# Patient Record
Sex: FEMALE | Race: OTHER | HISPANIC OR LATINO | URBAN - METROPOLITAN AREA
[De-identification: names, ages, dates, MRNs, and addresses within clinical notes are randomized per-mention and may not be internally consistent; named-entity substitution may affect disease eponyms.]

---

## 2017-04-28 PROBLEM — Z00.00 ENCOUNTER FOR PREVENTIVE HEALTH EXAMINATION: Status: ACTIVE | Noted: 2017-04-28

## 2017-05-01 ENCOUNTER — OUTPATIENT (OUTPATIENT)
Dept: OUTPATIENT SERVICES | Age: 27
LOS: 1 days | Discharge: HOME | End: 2017-05-01

## 2017-05-11 ENCOUNTER — APPOINTMENT (OUTPATIENT)
Dept: PEDIATRIC CARDIOLOGY | Facility: CLINIC | Age: 27
End: 2017-05-11

## 2017-08-13 ENCOUNTER — TRANSCRIPTION ENCOUNTER (OUTPATIENT)
Age: 27
End: 2017-08-13

## 2017-08-13 ENCOUNTER — INPATIENT (INPATIENT)
Facility: HOSPITAL | Age: 27
LOS: 4 days | Discharge: HOME | End: 2017-08-18
Attending: OBSTETRICS & GYNECOLOGY | Admitting: OBSTETRICS & GYNECOLOGY

## 2017-08-13 VITALS — HEIGHT: 62 IN | WEIGHT: 207.23 LBS

## 2017-08-13 DIAGNOSIS — Z3A.00 WEEKS OF GESTATION OF PREGNANCY NOT SPECIFIED: ICD-10-CM

## 2017-08-13 DIAGNOSIS — O26.899 OTHER SPECIFIED PREGNANCY RELATED CONDITIONS, UNSPECIFIED TRIMESTER: ICD-10-CM

## 2017-08-13 LAB
ALBUMIN SERPL ELPH-MCNC: 3.4 G/DL — SIGNIFICANT CHANGE UP (ref 3.3–5)
ALP SERPL-CCNC: 232 U/L — HIGH (ref 40–120)
ALT FLD-CCNC: 9 U/L — SIGNIFICANT CHANGE UP (ref 4–33)
APPEARANCE UR: CLEAR — SIGNIFICANT CHANGE UP
APTT BLD: 28 SEC — SIGNIFICANT CHANGE UP (ref 27.5–37.4)
AST SERPL-CCNC: 17 U/L — SIGNIFICANT CHANGE UP (ref 4–32)
BACTERIA # UR AUTO: SIGNIFICANT CHANGE UP
BASOPHILS # BLD AUTO: 0.01 K/UL — SIGNIFICANT CHANGE UP (ref 0–0.2)
BASOPHILS NFR BLD AUTO: 0.1 % — SIGNIFICANT CHANGE UP (ref 0–2)
BILIRUB SERPL-MCNC: 0.2 MG/DL — SIGNIFICANT CHANGE UP (ref 0.2–1.2)
BILIRUB UR-MCNC: NEGATIVE — SIGNIFICANT CHANGE UP
BLD GP AB SCN SERPL QL: NEGATIVE — SIGNIFICANT CHANGE UP
BLOOD UR QL VISUAL: HIGH
BUN SERPL-MCNC: 8 MG/DL — SIGNIFICANT CHANGE UP (ref 7–23)
CALCIUM SERPL-MCNC: 9 MG/DL — SIGNIFICANT CHANGE UP (ref 8.4–10.5)
CHLORIDE SERPL-SCNC: 104 MMOL/L — SIGNIFICANT CHANGE UP (ref 98–107)
CO2 SERPL-SCNC: 19 MMOL/L — LOW (ref 22–31)
COLOR SPEC: YELLOW — SIGNIFICANT CHANGE UP
CREAT ?TM UR-MCNC: 107.33 MG/DL — SIGNIFICANT CHANGE UP
CREAT SERPL-MCNC: 0.59 MG/DL — SIGNIFICANT CHANGE UP (ref 0.5–1.3)
EOSINOPHIL # BLD AUTO: 0.04 K/UL — SIGNIFICANT CHANGE UP (ref 0–0.5)
EOSINOPHIL NFR BLD AUTO: 0.4 % — SIGNIFICANT CHANGE UP (ref 0–6)
FIBRINOGEN PPP-MCNC: 658 MG/DL — HIGH (ref 310–510)
GLUCOSE SERPL-MCNC: 88 MG/DL — SIGNIFICANT CHANGE UP (ref 70–99)
GLUCOSE UR-MCNC: NEGATIVE — SIGNIFICANT CHANGE UP
HBV SURFACE AG SER-ACNC: NEGATIVE — SIGNIFICANT CHANGE UP
HCT VFR BLD CALC: 38.8 % — SIGNIFICANT CHANGE UP (ref 34.5–45)
HGB BLD-MCNC: 12.5 G/DL — SIGNIFICANT CHANGE UP (ref 11.5–15.5)
HIV1 AG SER QL: SIGNIFICANT CHANGE UP
HIV1+2 AB SPEC QL: SIGNIFICANT CHANGE UP
IMM GRANULOCYTES # BLD AUTO: 0.02 # — SIGNIFICANT CHANGE UP
IMM GRANULOCYTES NFR BLD AUTO: 0.2 % — SIGNIFICANT CHANGE UP (ref 0–1.5)
INR BLD: 0.89 — SIGNIFICANT CHANGE UP (ref 0.88–1.17)
KETONES UR-MCNC: SIGNIFICANT CHANGE UP
LDH SERPL L TO P-CCNC: 249 U/L — HIGH (ref 135–225)
LEUKOCYTE ESTERASE UR-ACNC: NEGATIVE — SIGNIFICANT CHANGE UP
LYMPHOCYTES # BLD AUTO: 3.14 K/UL — SIGNIFICANT CHANGE UP (ref 1–3.3)
LYMPHOCYTES # BLD AUTO: 32.7 % — SIGNIFICANT CHANGE UP (ref 13–44)
MCHC RBC-ENTMCNC: 25.3 PG — LOW (ref 27–34)
MCHC RBC-ENTMCNC: 32.2 % — SIGNIFICANT CHANGE UP (ref 32–36)
MCV RBC AUTO: 78.4 FL — LOW (ref 80–100)
MONOCYTES # BLD AUTO: 0.74 K/UL — SIGNIFICANT CHANGE UP (ref 0–0.9)
MONOCYTES NFR BLD AUTO: 7.7 % — SIGNIFICANT CHANGE UP (ref 2–14)
MUCOUS THREADS # UR AUTO: SIGNIFICANT CHANGE UP
NEUTROPHILS # BLD AUTO: 5.64 K/UL — SIGNIFICANT CHANGE UP (ref 1.8–7.4)
NEUTROPHILS NFR BLD AUTO: 58.9 % — SIGNIFICANT CHANGE UP (ref 43–77)
NITRITE UR-MCNC: NEGATIVE — SIGNIFICANT CHANGE UP
NRBC # FLD: 0 — SIGNIFICANT CHANGE UP
PH UR: 6.5 — SIGNIFICANT CHANGE UP (ref 4.6–8)
PLATELET # BLD AUTO: 214 K/UL — SIGNIFICANT CHANGE UP (ref 150–400)
PMV BLD: 11.1 FL — SIGNIFICANT CHANGE UP (ref 7–13)
POTASSIUM SERPL-MCNC: 4.1 MMOL/L — SIGNIFICANT CHANGE UP (ref 3.5–5.3)
POTASSIUM SERPL-SCNC: 4.1 MMOL/L — SIGNIFICANT CHANGE UP (ref 3.5–5.3)
PROT SERPL-MCNC: 6.6 G/DL — SIGNIFICANT CHANGE UP (ref 6–8.3)
PROT UR-MCNC: 100 — HIGH
PROT UR-MCNC: 78.3 MG/DL — SIGNIFICANT CHANGE UP
PROTHROM AB SERPL-ACNC: 9.9 SEC — SIGNIFICANT CHANGE UP (ref 9.8–13.1)
RBC # BLD: 4.95 M/UL — SIGNIFICANT CHANGE UP (ref 3.8–5.2)
RBC # FLD: 16.2 % — HIGH (ref 10.3–14.5)
RBC CASTS # UR COMP ASSIST: SIGNIFICANT CHANGE UP (ref 0–?)
RH IG SCN BLD-IMP: POSITIVE — SIGNIFICANT CHANGE UP
SODIUM SERPL-SCNC: 137 MMOL/L — SIGNIFICANT CHANGE UP (ref 135–145)
SP GR SPEC: 1.02 — SIGNIFICANT CHANGE UP (ref 1–1.03)
SQUAMOUS # UR AUTO: SIGNIFICANT CHANGE UP
URATE SERPL-MCNC: 4.1 MG/DL — SIGNIFICANT CHANGE UP (ref 2.5–7)
UROBILINOGEN FLD QL: NORMAL E.U. — SIGNIFICANT CHANGE UP (ref 0.1–0.2)
WBC # BLD: 9.59 K/UL — SIGNIFICANT CHANGE UP (ref 3.8–10.5)
WBC # FLD AUTO: 9.59 K/UL — SIGNIFICANT CHANGE UP (ref 3.8–10.5)
WBC UR QL: SIGNIFICANT CHANGE UP (ref 0–?)

## 2017-08-13 RX ORDER — OXYTOCIN 10 UNIT/ML
333.33 VIAL (ML) INJECTION
Qty: 20 | Refills: 0 | Status: DISCONTINUED | OUTPATIENT
Start: 2017-08-13 | End: 2017-08-13

## 2017-08-13 RX ORDER — AMPICILLIN TRIHYDRATE 250 MG
1 CAPSULE ORAL EVERY 4 HOURS
Qty: 0 | Refills: 0 | Status: DISCONTINUED | OUTPATIENT
Start: 2017-08-13 | End: 2017-08-14

## 2017-08-13 RX ORDER — SODIUM CHLORIDE 9 MG/ML
500 INJECTION INTRAMUSCULAR; INTRAVENOUS; SUBCUTANEOUS ONCE
Qty: 0 | Refills: 0 | Status: COMPLETED | OUTPATIENT
Start: 2017-08-13 | End: 2017-08-13

## 2017-08-13 RX ORDER — SODIUM CHLORIDE 9 MG/ML
125 INJECTION, SOLUTION INTRAVENOUS ONCE
Qty: 0 | Refills: 0 | Status: DISCONTINUED | OUTPATIENT
Start: 2017-08-13 | End: 2017-08-13

## 2017-08-13 RX ORDER — SODIUM CHLORIDE 9 MG/ML
1000 INJECTION, SOLUTION INTRAVENOUS
Qty: 0 | Refills: 0 | Status: DISCONTINUED | OUTPATIENT
Start: 2017-08-13 | End: 2017-08-13

## 2017-08-13 RX ORDER — LEVOTHYROXINE SODIUM 125 MCG
50 TABLET ORAL DAILY
Qty: 0 | Refills: 0 | Status: DISCONTINUED | OUTPATIENT
Start: 2017-08-13 | End: 2017-08-14

## 2017-08-13 RX ORDER — METOCLOPRAMIDE HCL 10 MG
25 TABLET ORAL ONCE
Qty: 0 | Refills: 0 | Status: DISCONTINUED | OUTPATIENT
Start: 2017-08-13 | End: 2017-08-13

## 2017-08-13 RX ORDER — BUTORPHANOL TARTRATE 2 MG/ML
2 INJECTION, SOLUTION INTRAMUSCULAR; INTRAVENOUS ONCE
Qty: 0 | Refills: 0 | Status: DISCONTINUED | OUTPATIENT
Start: 2017-08-13 | End: 2017-08-13

## 2017-08-13 RX ORDER — AMPICILLIN TRIHYDRATE 250 MG
2 CAPSULE ORAL ONCE
Qty: 0 | Refills: 0 | Status: COMPLETED | OUTPATIENT
Start: 2017-08-13 | End: 2017-08-13

## 2017-08-13 RX ORDER — AMPICILLIN TRIHYDRATE 250 MG
CAPSULE ORAL
Qty: 0 | Refills: 0 | Status: DISCONTINUED | OUTPATIENT
Start: 2017-08-13 | End: 2017-08-14

## 2017-08-13 RX ORDER — SODIUM CHLORIDE 9 MG/ML
300 INJECTION INTRAMUSCULAR; INTRAVENOUS; SUBCUTANEOUS ONCE
Qty: 0 | Refills: 0 | Status: DISCONTINUED | OUTPATIENT
Start: 2017-08-13 | End: 2017-08-14

## 2017-08-13 RX ADMIN — Medication 108 GRAM(S): at 22:50

## 2017-08-13 RX ADMIN — Medication 108 GRAM(S): at 05:36

## 2017-08-13 RX ADMIN — BUTORPHANOL TARTRATE 2 MILLIGRAM(S): 2 INJECTION, SOLUTION INTRAMUSCULAR; INTRAVENOUS at 09:35

## 2017-08-13 RX ADMIN — SODIUM CHLORIDE 1000 MILLILITER(S): 9 INJECTION INTRAMUSCULAR; INTRAVENOUS; SUBCUTANEOUS at 19:52

## 2017-08-13 RX ADMIN — Medication 10 MILLIGRAM(S): at 23:59

## 2017-08-13 RX ADMIN — Medication 204 MILLIGRAM(S): at 09:35

## 2017-08-13 RX ADMIN — Medication 50 MICROGRAM(S): at 06:06

## 2017-08-13 RX ADMIN — Medication 108 GRAM(S): at 18:10

## 2017-08-13 RX ADMIN — FAMOTIDINE 20 MILLIGRAM(S): 10 INJECTION INTRAVENOUS at 23:59

## 2017-08-13 RX ADMIN — SODIUM CHLORIDE 125 MILLILITER(S): 9 INJECTION, SOLUTION INTRAVENOUS at 05:36

## 2017-08-13 RX ADMIN — SODIUM CHLORIDE 125 MILLILITER(S): 9 INJECTION, SOLUTION INTRAVENOUS at 09:36

## 2017-08-13 RX ADMIN — Medication 108 GRAM(S): at 10:00

## 2017-08-13 RX ADMIN — BUTORPHANOL TARTRATE 2 MILLIGRAM(S): 2 INJECTION, SOLUTION INTRAMUSCULAR; INTRAVENOUS at 10:10

## 2017-08-13 RX ADMIN — Medication 108 GRAM(S): at 14:10

## 2017-08-14 ENCOUNTER — TRANSCRIPTION ENCOUNTER (OUTPATIENT)
Age: 27
End: 2017-08-14

## 2017-08-14 LAB
HCT VFR BLD CALC: 31.8 % — LOW (ref 34.5–45)
HGB BLD-MCNC: 10.1 G/DL — LOW (ref 11.5–15.5)
MCHC RBC-ENTMCNC: 24.7 PG — LOW (ref 27–34)
MCHC RBC-ENTMCNC: 31.8 % — LOW (ref 32–36)
MCV RBC AUTO: 77.8 FL — LOW (ref 80–100)
NRBC # FLD: 0 — SIGNIFICANT CHANGE UP
PLATELET # BLD AUTO: 182 K/UL — SIGNIFICANT CHANGE UP (ref 150–400)
PMV BLD: 10.7 FL — SIGNIFICANT CHANGE UP (ref 7–13)
RBC # BLD: 4.09 M/UL — SIGNIFICANT CHANGE UP (ref 3.8–5.2)
RBC # FLD: 16.5 % — HIGH (ref 10.3–14.5)
RUBV IGG SER-ACNC: 1.7 INDEX — SIGNIFICANT CHANGE UP
RUBV IGG SER-IMP: POSITIVE — SIGNIFICANT CHANGE UP
T PALLIDUM AB TITR SER: NEGATIVE — SIGNIFICANT CHANGE UP
WBC # BLD: 11.58 K/UL — HIGH (ref 3.8–10.5)
WBC # FLD AUTO: 11.58 K/UL — HIGH (ref 3.8–10.5)

## 2017-08-14 RX ORDER — LEVOTHYROXINE SODIUM 125 MCG
50 TABLET ORAL DAILY
Qty: 0 | Refills: 0 | Status: DISCONTINUED | OUTPATIENT
Start: 2017-08-14 | End: 2017-08-18

## 2017-08-14 RX ORDER — DIPHENHYDRAMINE HCL 50 MG
25 CAPSULE ORAL EVERY 6 HOURS
Qty: 0 | Refills: 0 | Status: DISCONTINUED | OUTPATIENT
Start: 2017-08-14 | End: 2017-08-18

## 2017-08-14 RX ORDER — GENTAMICIN SULFATE 40 MG/ML
80 VIAL (ML) INJECTION ONCE
Qty: 0 | Refills: 0 | Status: COMPLETED | OUTPATIENT
Start: 2017-08-14 | End: 2017-08-14

## 2017-08-14 RX ORDER — SODIUM CHLORIDE 9 MG/ML
1000 INJECTION, SOLUTION INTRAVENOUS ONCE
Qty: 0 | Refills: 0 | Status: DISCONTINUED | OUTPATIENT
Start: 2017-08-14 | End: 2017-08-14

## 2017-08-14 RX ORDER — DOCUSATE SODIUM 100 MG
100 CAPSULE ORAL
Qty: 0 | Refills: 0 | Status: DISCONTINUED | OUTPATIENT
Start: 2017-08-14 | End: 2017-08-18

## 2017-08-14 RX ORDER — BUTORPHANOL TARTRATE 2 MG/ML
0.25 INJECTION, SOLUTION INTRAMUSCULAR; INTRAVENOUS EVERY 6 HOURS
Qty: 0 | Refills: 0 | Status: DISCONTINUED | OUTPATIENT
Start: 2017-08-14 | End: 2017-08-15

## 2017-08-14 RX ORDER — OXYCODONE HYDROCHLORIDE 5 MG/1
5 TABLET ORAL
Qty: 0 | Refills: 0 | Status: DISCONTINUED | OUTPATIENT
Start: 2017-08-14 | End: 2017-08-14

## 2017-08-14 RX ORDER — FAMOTIDINE 10 MG/ML
20 INJECTION INTRAVENOUS ONCE
Qty: 0 | Refills: 0 | Status: COMPLETED | OUTPATIENT
Start: 2017-08-14 | End: 2017-08-13

## 2017-08-14 RX ORDER — OXYTOCIN 10 UNIT/ML
333.33 VIAL (ML) INJECTION
Qty: 20 | Refills: 0 | Status: DISCONTINUED | OUTPATIENT
Start: 2017-08-14 | End: 2017-08-14

## 2017-08-14 RX ORDER — METOCLOPRAMIDE HCL 10 MG
10 TABLET ORAL ONCE
Qty: 0 | Refills: 0 | Status: DISCONTINUED | OUTPATIENT
Start: 2017-08-14 | End: 2017-08-15

## 2017-08-14 RX ORDER — LANOLIN
1 OINTMENT (GRAM) TOPICAL
Qty: 0 | Refills: 0 | Status: DISCONTINUED | OUTPATIENT
Start: 2017-08-14 | End: 2017-08-18

## 2017-08-14 RX ORDER — OXYCODONE HYDROCHLORIDE 5 MG/1
10 TABLET ORAL
Qty: 0 | Refills: 0 | Status: DISCONTINUED | OUTPATIENT
Start: 2017-08-14 | End: 2017-08-14

## 2017-08-14 RX ORDER — ONDANSETRON 8 MG/1
4 TABLET, FILM COATED ORAL EVERY 6 HOURS
Qty: 0 | Refills: 0 | Status: DISCONTINUED | OUTPATIENT
Start: 2017-08-14 | End: 2017-08-15

## 2017-08-14 RX ORDER — HEPARIN SODIUM 5000 [USP'U]/ML
5000 INJECTION INTRAVENOUS; SUBCUTANEOUS EVERY 12 HOURS
Qty: 0 | Refills: 0 | Status: DISCONTINUED | OUTPATIENT
Start: 2017-08-14 | End: 2017-08-18

## 2017-08-14 RX ORDER — GENTAMICIN SULFATE 40 MG/ML
80 VIAL (ML) INJECTION ONCE
Qty: 0 | Refills: 0 | Status: DISCONTINUED | OUTPATIENT
Start: 2017-08-14 | End: 2017-08-14

## 2017-08-14 RX ORDER — CITRIC ACID/SODIUM CITRATE 300-500 MG
30 SOLUTION, ORAL ORAL ONCE
Qty: 0 | Refills: 0 | Status: DISCONTINUED | OUTPATIENT
Start: 2017-08-14 | End: 2017-08-15

## 2017-08-14 RX ORDER — FAMOTIDINE 10 MG/ML
20 INJECTION INTRAVENOUS ONCE
Qty: 0 | Refills: 0 | Status: DISCONTINUED | OUTPATIENT
Start: 2017-08-14 | End: 2017-08-15

## 2017-08-14 RX ORDER — GENTAMICIN SULFATE 40 MG/ML
VIAL (ML) INJECTION
Qty: 0 | Refills: 0 | Status: DISCONTINUED | OUTPATIENT
Start: 2017-08-14 | End: 2017-08-15

## 2017-08-14 RX ORDER — CITRIC ACID/SODIUM CITRATE 300-500 MG
30 SOLUTION, ORAL ORAL ONCE
Qty: 0 | Refills: 0 | Status: COMPLETED | OUTPATIENT
Start: 2017-08-14 | End: 2017-08-14

## 2017-08-14 RX ORDER — HYDROMORPHONE HYDROCHLORIDE 2 MG/ML
1 INJECTION INTRAMUSCULAR; INTRAVENOUS; SUBCUTANEOUS
Qty: 0 | Refills: 0 | Status: DISCONTINUED | OUTPATIENT
Start: 2017-08-14 | End: 2017-08-14

## 2017-08-14 RX ORDER — CEFAZOLIN SODIUM 1 G
2000 VIAL (EA) INJECTION ONCE
Qty: 0 | Refills: 0 | Status: DISCONTINUED | OUTPATIENT
Start: 2017-08-14 | End: 2017-08-14

## 2017-08-14 RX ORDER — IBUPROFEN 200 MG
600 TABLET ORAL EVERY 6 HOURS
Qty: 0 | Refills: 0 | Status: COMPLETED | OUTPATIENT
Start: 2017-08-14 | End: 2018-07-13

## 2017-08-14 RX ORDER — GENTAMICIN SULFATE 40 MG/ML
VIAL (ML) INJECTION
Qty: 0 | Refills: 0 | Status: DISCONTINUED | OUTPATIENT
Start: 2017-08-14 | End: 2017-08-14

## 2017-08-14 RX ORDER — NALOXONE HYDROCHLORIDE 4 MG/.1ML
0.1 SPRAY NASAL
Qty: 0 | Refills: 0 | Status: DISCONTINUED | OUTPATIENT
Start: 2017-08-14 | End: 2017-08-15

## 2017-08-14 RX ORDER — OXYTOCIN 10 UNIT/ML
41.67 VIAL (ML) INJECTION
Qty: 20 | Refills: 0 | Status: DISCONTINUED | OUTPATIENT
Start: 2017-08-14 | End: 2017-08-14

## 2017-08-14 RX ORDER — METOCLOPRAMIDE HCL 10 MG
10 TABLET ORAL ONCE
Qty: 0 | Refills: 0 | Status: COMPLETED | OUTPATIENT
Start: 2017-08-14 | End: 2017-08-13

## 2017-08-14 RX ORDER — FERROUS SULFATE 325(65) MG
325 TABLET ORAL DAILY
Qty: 0 | Refills: 0 | Status: DISCONTINUED | OUTPATIENT
Start: 2017-08-14 | End: 2017-08-18

## 2017-08-14 RX ORDER — OXYCODONE HYDROCHLORIDE 5 MG/1
5 TABLET ORAL
Qty: 0 | Refills: 0 | Status: COMPLETED | OUTPATIENT
Start: 2017-08-14 | End: 2017-08-21

## 2017-08-14 RX ORDER — OXYCODONE HYDROCHLORIDE 5 MG/1
5 TABLET ORAL EVERY 4 HOURS
Qty: 0 | Refills: 0 | Status: COMPLETED | OUTPATIENT
Start: 2017-08-14 | End: 2017-08-21

## 2017-08-14 RX ORDER — SIMETHICONE 80 MG/1
80 TABLET, CHEWABLE ORAL EVERY 4 HOURS
Qty: 0 | Refills: 0 | Status: DISCONTINUED | OUTPATIENT
Start: 2017-08-14 | End: 2017-08-18

## 2017-08-14 RX ORDER — SODIUM CHLORIDE 9 MG/ML
1000 INJECTION, SOLUTION INTRAVENOUS
Qty: 0 | Refills: 0 | Status: DISCONTINUED | OUTPATIENT
Start: 2017-08-14 | End: 2017-08-14

## 2017-08-14 RX ORDER — GLYCERIN ADULT
1 SUPPOSITORY, RECTAL RECTAL AT BEDTIME
Qty: 0 | Refills: 0 | Status: DISCONTINUED | OUTPATIENT
Start: 2017-08-14 | End: 2017-08-18

## 2017-08-14 RX ORDER — SODIUM CHLORIDE 9 MG/ML
1000 INJECTION, SOLUTION INTRAVENOUS
Qty: 0 | Refills: 0 | Status: DISCONTINUED | OUTPATIENT
Start: 2017-08-14 | End: 2017-08-15

## 2017-08-14 RX ORDER — ACETAMINOPHEN 500 MG
975 TABLET ORAL ONCE
Qty: 0 | Refills: 0 | Status: COMPLETED | OUTPATIENT
Start: 2017-08-14 | End: 2017-08-14

## 2017-08-14 RX ORDER — ACETAMINOPHEN 500 MG
975 TABLET ORAL EVERY 6 HOURS
Qty: 0 | Refills: 0 | Status: DISCONTINUED | OUTPATIENT
Start: 2017-08-14 | End: 2017-08-18

## 2017-08-14 RX ORDER — TETANUS TOXOID, REDUCED DIPHTHERIA TOXOID AND ACELLULAR PERTUSSIS VACCINE, ADSORBED 5; 2.5; 8; 8; 2.5 [IU]/.5ML; [IU]/.5ML; UG/.5ML; UG/.5ML; UG/.5ML
0.5 SUSPENSION INTRAMUSCULAR ONCE
Qty: 0 | Refills: 0 | Status: DISCONTINUED | OUTPATIENT
Start: 2017-08-14 | End: 2017-08-18

## 2017-08-14 RX ORDER — KETOROLAC TROMETHAMINE 30 MG/ML
30 SYRINGE (ML) INJECTION EVERY 6 HOURS
Qty: 0 | Refills: 0 | Status: DISCONTINUED | OUTPATIENT
Start: 2017-08-14 | End: 2017-08-15

## 2017-08-14 RX ORDER — GENTAMICIN SULFATE 40 MG/ML
80 VIAL (ML) INJECTION EVERY 8 HOURS
Qty: 0 | Refills: 0 | Status: DISCONTINUED | OUTPATIENT
Start: 2017-08-14 | End: 2017-08-14

## 2017-08-14 RX ORDER — GENTAMICIN SULFATE 40 MG/ML
80 VIAL (ML) INJECTION EVERY 8 HOURS
Qty: 0 | Refills: 0 | Status: DISCONTINUED | OUTPATIENT
Start: 2017-08-14 | End: 2017-08-15

## 2017-08-14 RX ADMIN — Medication 30 MILLIGRAM(S): at 04:00

## 2017-08-14 RX ADMIN — Medication 50 MICROGRAM(S): at 13:44

## 2017-08-14 RX ADMIN — Medication 30 MILLIGRAM(S): at 09:57

## 2017-08-14 RX ADMIN — SODIUM CHLORIDE 125 MILLILITER(S): 9 INJECTION, SOLUTION INTRAVENOUS at 10:18

## 2017-08-14 RX ADMIN — Medication 200 MILLIGRAM(S): at 22:21

## 2017-08-14 RX ADMIN — Medication 30 MILLIGRAM(S): at 09:42

## 2017-08-14 RX ADMIN — Medication 30 MILLILITER(S): at 00:21

## 2017-08-14 RX ADMIN — Medication 125 MILLIUNIT(S)/MIN: at 03:40

## 2017-08-14 RX ADMIN — Medication 200 MILLIGRAM(S): at 13:47

## 2017-08-14 RX ADMIN — Medication 100 MILLIGRAM(S): at 08:28

## 2017-08-14 RX ADMIN — Medication 100 MILLIGRAM(S): at 16:36

## 2017-08-14 RX ADMIN — Medication 975 MILLIGRAM(S): at 04:18

## 2017-08-14 RX ADMIN — HEPARIN SODIUM 5000 UNIT(S): 5000 INJECTION INTRAVENOUS; SUBCUTANEOUS at 13:44

## 2017-08-14 RX ADMIN — Medication 200 MILLIGRAM(S): at 05:14

## 2017-08-14 RX ADMIN — Medication 100 MILLIGRAM(S): at 04:30

## 2017-08-14 RX ADMIN — Medication 30 MILLIGRAM(S): at 03:37

## 2017-08-14 RX ADMIN — Medication 125 MILLIUNIT(S)/MIN: at 05:24

## 2017-08-14 RX ADMIN — Medication 30 MILLIGRAM(S): at 18:30

## 2017-08-14 RX ADMIN — Medication 975 MILLIGRAM(S): at 04:45

## 2017-08-14 RX ADMIN — Medication 975 MILLIGRAM(S): at 22:21

## 2017-08-14 NOTE — DISCHARGE NOTE OB - CARE PLAN
Principal Discharge DX:	Postpartum care following  delivery  Goal:	full recovery  Instructions for follow-up, activity and diet:	nothing per vagina, no heavy lifting for 6 weeks

## 2017-08-14 NOTE — DISCHARGE NOTE OB - CARE PROVIDER_API CALL
Kathia Daley), Obstetrics and Gynecology  [address]  [address]  [place] [address]  Phone: [phone]  Fax: [phone]

## 2017-08-14 NOTE — PROGRESS NOTE ADULT - ASSESSMENT
A/P: 27yo  now POD0 s/p pLTCS for NRFHT c/b PP fevers at 0400 and 1800 today.  Exam reassuring with no focal signs of infection.  Pt on Gent/Clinda since 0400.  Pt was refusing po tylenol throughout the day.  IV line is infiltrated and pt desires breast pump while baby is in NICU.  Pt is scheduled for 6p CBC.

## 2017-08-14 NOTE — PROGRESS NOTE ADULT - PROBLEM SELECTOR PLAN 1
-continue with 6p CBC  -blood and urine cultures x2  -continue Gent/Clinda until 24h afebrile  -q4h VS  -po tylenol for fever  -remove and replace infiltrated IV line  -continue maintenance IVF and encourage po clear liquids  -SCDs in place  -cold packs for comfort  -breast pump    Discussed with Dr. Pink.    Savanah Swann MD PGY1  Patient seen and examined with aid from Conner Matamoros, MS3

## 2017-08-14 NOTE — PROGRESS NOTE ADULT - SUBJECTIVE AND OBJECTIVE BOX
R1 Postpartum Event Note    Pt is [place]er-only speaking.  Interview was conducted with aid of Cnoner Matamoros, MS3 who is fluent in [place]; as well as patient's  who speaks both english and Welsh.    S-Called to room by nurse for patient rectal temperature of 38.5C.  Patient is currently on Gent/Clinda since 0400 today when she had a temperature of 38.7. Since then she has been accepting the IV abx however she has refused tylenol throughout the day.  She has not started breast feeding.  She feels breast fullness but denies pain.  She is passing flatus and taking some po.  She has been out of bed once with mild dizzyness on standing but had no other difficulties ambulating.  Helms catheter is still in place.  She denies HA, SOB, CP, N, V, D, Abd pain, incisional pain, suprapubic or flank pain, and lower extremity pain/swelling.  She has no purulent vaginal discharge.  Lochia normal, <menses.  Pt complains of nonpainful localized swelling in right hand since infusion of iv toradol through that arm's iv line this afternoon.    O-VS: 38.7C (rectal), , /72, RR 18, SpO2 100%  Gen: NAD, A&Ox3, sitting up in bed  Skin: warm and dry to touch  HEENT: no scleral icterus, mucosae moist and pink, no tonsillar/palatal erythema, neck supple  CV: tachycardia, regular rhythm, no m/g/r  Resp: posterior, apical and lateral lung fields clear to auscultation bilaterally, no dullness to percussino  Abd: soft, NBS, non distended, no tympany to percussion, appropriately ttp, no rebound/peritoneal signs, incision c/d/i w/o erythema/edema, uterus firm and below umbilicus  : no flank or suprapubic ttp, no purulent vaginal discharge noted on pad, lochia wnl  Ext: no LE ttp, erythema or focal edema; b/l 1+ pitting edema; SCDs in place

## 2017-08-14 NOTE — PROVIDER CONTACT NOTE (OTHER) - ASSESSMENT
Fundus firm without massage, light bleeding, lungs are clear bilaterally, reflexes 2+, no c/o of dizziness or SOB, bp136/78, HR89, pulse ox98, temp 38.7 rectal, 37.9 oral. Pt. states to feel warm. Pt. examined and evaluated by MD Dia in PACU 6.

## 2017-08-15 LAB — SPECIMEN SOURCE: SIGNIFICANT CHANGE UP

## 2017-08-15 RX ORDER — OXYCODONE HYDROCHLORIDE 5 MG/1
5 TABLET ORAL
Qty: 0 | Refills: 0 | Status: DISCONTINUED | OUTPATIENT
Start: 2017-08-15 | End: 2017-08-18

## 2017-08-15 RX ORDER — IBUPROFEN 200 MG
600 TABLET ORAL EVERY 6 HOURS
Qty: 0 | Refills: 0 | Status: DISCONTINUED | OUTPATIENT
Start: 2017-08-15 | End: 2017-08-18

## 2017-08-15 RX ADMIN — Medication 600 MILLIGRAM(S): at 06:45

## 2017-08-15 RX ADMIN — Medication 600 MILLIGRAM(S): at 18:57

## 2017-08-15 RX ADMIN — Medication 975 MILLIGRAM(S): at 18:58

## 2017-08-15 RX ADMIN — Medication 600 MILLIGRAM(S): at 19:26

## 2017-08-15 RX ADMIN — Medication 600 MILLIGRAM(S): at 05:48

## 2017-08-15 RX ADMIN — Medication 100 MILLIGRAM(S): at 00:29

## 2017-08-15 RX ADMIN — SIMETHICONE 80 MILLIGRAM(S): 80 TABLET, CHEWABLE ORAL at 18:58

## 2017-08-15 RX ADMIN — Medication 975 MILLIGRAM(S): at 05:48

## 2017-08-15 RX ADMIN — Medication 100 MILLIGRAM(S): at 18:57

## 2017-08-15 RX ADMIN — Medication 200 MILLIGRAM(S): at 14:29

## 2017-08-15 RX ADMIN — HEPARIN SODIUM 5000 UNIT(S): 5000 INJECTION INTRAVENOUS; SUBCUTANEOUS at 00:31

## 2017-08-15 RX ADMIN — Medication 30 MILLIGRAM(S): at 00:30

## 2017-08-15 RX ADMIN — Medication 30 MILLIGRAM(S): at 01:30

## 2017-08-15 RX ADMIN — Medication 50 MICROGRAM(S): at 05:49

## 2017-08-15 RX ADMIN — Medication 600 MILLIGRAM(S): at 12:20

## 2017-08-15 RX ADMIN — Medication 975 MILLIGRAM(S): at 19:26

## 2017-08-15 RX ADMIN — Medication 975 MILLIGRAM(S): at 12:50

## 2017-08-15 RX ADMIN — Medication 975 MILLIGRAM(S): at 12:20

## 2017-08-15 RX ADMIN — SIMETHICONE 80 MILLIGRAM(S): 80 TABLET, CHEWABLE ORAL at 10:45

## 2017-08-15 RX ADMIN — Medication 975 MILLIGRAM(S): at 06:45

## 2017-08-15 RX ADMIN — Medication 200 MILLIGRAM(S): at 05:48

## 2017-08-15 RX ADMIN — SIMETHICONE 80 MILLIGRAM(S): 80 TABLET, CHEWABLE ORAL at 14:28

## 2017-08-15 RX ADMIN — OXYCODONE HYDROCHLORIDE 5 MILLIGRAM(S): 5 TABLET ORAL at 23:51

## 2017-08-15 RX ADMIN — HEPARIN SODIUM 5000 UNIT(S): 5000 INJECTION INTRAVENOUS; SUBCUTANEOUS at 14:28

## 2017-08-15 RX ADMIN — Medication 100 MILLIGRAM(S): at 16:04

## 2017-08-15 RX ADMIN — Medication 100 MILLIGRAM(S): at 08:41

## 2017-08-15 RX ADMIN — Medication 100 MILLIGRAM(S): at 10:45

## 2017-08-15 RX ADMIN — Medication 600 MILLIGRAM(S): at 12:50

## 2017-08-15 NOTE — PROGRESS NOTE ADULT - SUBJECTIVE AND OBJECTIVE BOX
ANESTHESIA POSTOP CHECK    26y Female POSTOP DAY 1     No COMPLAINTS    NO APPARENT ANESTHESIA COMPLICATIONS

## 2017-08-15 NOTE — PROGRESS NOTE ADULT - SUBJECTIVE AND OBJECTIVE BOX
POST OP DAY  1  s/p   SECTION    SUBJECTIVE:    PAIN SCALE SCORE: [x] Refer to charted pain scores    THERAPY:  [  ] Spinal morphine   [ x ] Epidural morphine   [  ] IV PCA Hydromorphone 1 mg/ml    OBJECTIVE:     SEDATION SCORE:	  [ x ] Alert	    [  ] Drowsy        [  ] Arousable	[  ] Asleep	[  ] Unresponsive    Side Effects:	  [ x ] None	     [  ] Nausea        [  ] Pruritus        [  ] Weakness   [  ] Numbness        ASSESSMENT/ PLAN   [   ] Discontinue         [  ] Continue    [ x ] Change to prn Analgesics as per primary service.    DOCUMENTATION & VERIFICATION OF CURRENT MEDS [ x ] Done    COMMENTS: No Headache.

## 2017-08-15 NOTE — PROGRESS NOTE ADULT - ASSESSMENT
25y/o  POD#1 from pLTCS for NRFHT in stable condition. Current issues include postpartum temperature.

## 2017-08-15 NOTE — PROGRESS NOTE ADULT - SUBJECTIVE AND OBJECTIVE BOX
Patient seen and examined at bedside, no acute overnight events. No acute complaints, pain well controlled. Patient is ambulating and tolerating regular diet. Has passed flatus but has not had a BM. Helms is still in place.     Vital Signs Last 24 Hours  T(C): 2.6 (08-15-17 @ 10:00), Max: 38.5 (08-14-17 @ 18:14)  HR: 80 (08-15-17 @ 10:00) (80 - 110)  BP: 120/80 (08-15-17 @ 10:00) (120/63 - 137/83)  RR: 18 (08-15-17 @ 10:00) (18 - 22)  SpO2: 100% (08-15-17 @ 10:00) (96% - 100%)    I&O's Summary    14 Aug 2017 07:01  -  15 Aug 2017 07:00  --------------------------------------------------------  IN: 1375 mL / OUT: 5915 mL / NET: -4540 mL    15 Aug 2017 07:01  -  15 Aug 2017 10:46  --------------------------------------------------------  IN: 0 mL / OUT: 300 mL / NET: -300 mL        Physical Exam:  General: NAD  Abdomen: Soft, non-tender, non-distended, fundus firm  Incision: Pfannenstiel incision CDI, subcuticular suture closure, steristrips in place  Pelvic: Lochia wnl    Labs:    Blood Type: O Positive  Rubella IgG: Positive (Positive=Immune, Negative=Non-Immune)  RPR: Negative               10.1   11.58 )-----------( 182      ( 08-14 @ 19:30 )             31.8                12.5   9.59  )-----------( 214      ( 08-13 @ 05:00 )             38.8         MEDICATIONS  (STANDING):  famotidine Injectable 20 milliGRAM(s) IV Push once  metoclopramide Injectable 10 milliGRAM(s) IV Push once  citric acid/sodium citrate Solution 30 milliLiter(s) Oral once  acetaminophen   Tablet. 975 milliGRAM(s) Oral every 6 hours  oxyCODONE    IR 5 milliGRAM(s) Oral every 3 hours  heparin  Injectable 5000 Unit(s) SubCutaneous every 12 hours  lactated ringers. 1000 milliLiter(s) (125 mL/Hr) IV Continuous <Continuous>  diphtheria/tetanus/pertussis (acellular) Vaccine (ADAcel) 0.5 milliLiter(s) IntraMuscular once  ferrous    sulfate 325 milliGRAM(s) Oral daily  prenatal multivitamin 1 Tablet(s) Oral daily  gentamicin   IVPB   IV Intermittent   gentamicin   IVPB 80 milliGRAM(s) IV Intermittent every 8 hours  levothyroxine 50 MICROGram(s) Oral daily  clindamycin IVPB      clindamycin IVPB 900 milliGRAM(s) IV Intermittent every 8 hours  ibuprofen  Tablet 600 milliGRAM(s) Oral every 6 hours    MEDICATIONS  (PRN):  oxyCODONE    IR 5 milliGRAM(s) Oral every 3 hours PRN Mild Pain  oxyCODONE    IR 10 milliGRAM(s) Oral every 3 hours PRN Moderate Pain  HYDROmorphone  Injectable 1 milliGRAM(s) IV Push every 3 hours PRN Severe Pain  naloxone Injectable 0.1 milliGRAM(s) IV Push every 3 minutes PRN For ANY of the following changes in patient status:  A. RR LESS THAN 10 breaths per minute, B. Oxygen saturation LESS THAN 90%, C. Sedation score of 6  ondansetron Injectable 4 milliGRAM(s) IV Push every 6 hours PRN Nausea  butorphanol Injectable 0.25 milliGRAM(s) IV Push every 6 hours PRN Pruritus  oxyCODONE    IR 5 milliGRAM(s) Oral every 4 hours PRN Severe Pain (7 - 10)  simethicone 80 milliGRAM(s) Chew every 4 hours PRN Gas  diphenhydrAMINE   Capsule 25 milliGRAM(s) Oral every 6 hours PRN Itching  glycerin Suppository - Adult 1 Suppository(s) Rectal at bedtime PRN Constipation  docusate sodium 100 milliGRAM(s) Oral two times a day PRN Stool Softening  lanolin Ointment 1 Application(s) Topical every 3 hours PRN Sore Nipples

## 2017-08-15 NOTE — PROGRESS NOTE ADULT - PROBLEM SELECTOR PLAN 1
- Continue with po analgesia  - Increase ambulation  - Continue regular diet  - Check CBC  - D/c Helms  - Incision dressing removed

## 2017-08-16 LAB
BACTERIA UR CULT: SIGNIFICANT CHANGE UP
SPECIMEN SOURCE: SIGNIFICANT CHANGE UP

## 2017-08-16 RX ORDER — OXYCODONE HYDROCHLORIDE 5 MG/1
5 TABLET ORAL EVERY 4 HOURS
Qty: 0 | Refills: 0 | Status: DISCONTINUED | OUTPATIENT
Start: 2017-08-16 | End: 2017-08-18

## 2017-08-16 RX ORDER — BENZOCAINE AND MENTHOL 5; 1 G/100ML; G/100ML
1 LIQUID ORAL THREE TIMES A DAY
Qty: 0 | Refills: 0 | Status: DISCONTINUED | OUTPATIENT
Start: 2017-08-16 | End: 2017-08-18

## 2017-08-16 RX ADMIN — Medication 600 MILLIGRAM(S): at 18:26

## 2017-08-16 RX ADMIN — Medication 975 MILLIGRAM(S): at 01:16

## 2017-08-16 RX ADMIN — HEPARIN SODIUM 5000 UNIT(S): 5000 INJECTION INTRAVENOUS; SUBCUTANEOUS at 01:16

## 2017-08-16 RX ADMIN — Medication 600 MILLIGRAM(S): at 02:00

## 2017-08-16 RX ADMIN — Medication 600 MILLIGRAM(S): at 19:13

## 2017-08-16 RX ADMIN — Medication 600 MILLIGRAM(S): at 01:16

## 2017-08-16 RX ADMIN — SIMETHICONE 80 MILLIGRAM(S): 80 TABLET, CHEWABLE ORAL at 20:14

## 2017-08-16 RX ADMIN — HEPARIN SODIUM 5000 UNIT(S): 5000 INJECTION INTRAVENOUS; SUBCUTANEOUS at 23:35

## 2017-08-16 RX ADMIN — Medication 975 MILLIGRAM(S): at 02:00

## 2017-08-16 RX ADMIN — HEPARIN SODIUM 5000 UNIT(S): 5000 INJECTION INTRAVENOUS; SUBCUTANEOUS at 12:06

## 2017-08-16 RX ADMIN — BENZOCAINE AND MENTHOL 1 LOZENGE: 5; 1 LIQUID ORAL at 12:55

## 2017-08-16 RX ADMIN — Medication 50 MICROGRAM(S): at 06:40

## 2017-08-16 RX ADMIN — Medication 100 MILLIGRAM(S): at 20:14

## 2017-08-16 RX ADMIN — OXYCODONE HYDROCHLORIDE 5 MILLIGRAM(S): 5 TABLET ORAL at 00:30

## 2017-08-16 NOTE — PROGRESS NOTE ADULT - SUBJECTIVE AND OBJECTIVE BOX
POD#2  Patient seen and examined at bedside, no acute overnight events. No acute complaints, pain well controlled.  Patient is ambulating and tolerating regular diet. Passing flatus and voiding spontaneously. Pt is breast feeding her baby.    Vital Signs Last 24 Hours  T(C): 36.6 (08-16-17 @ 05:58), Max: 37.2 (08-15-17 @ 22:34)  HR: 75 (08-16-17 @ 05:58) (75 - 98)  BP: 137/74 (08-16-17 @ 05:58) (120/80 - 137/74)  RR: 18 (08-16-17 @ 05:58) (18 - 18)  SpO2: 100% (08-16-17 @ 05:58) (99% - 100%)    I&O's Summary    15 Aug 2017 07:01  -  16 Aug 2017 07:00  --------------------------------------------------------  IN: 0 mL / OUT: 300 mL / NET: -300 mL        Physical Exam:  General: NAD  Abdomen: Soft, non-tender, non-distended, fundus firm  Incision: Pfannenstiel incision CDI, subcuticular suture closure  Pelvic: Lochia wnl  Ext: NTBL  Labs:  Blood type: O Positive  Rubella IgG: Positive (08-13 @ 09:50)  RPR: Negative                          10.1<L>   11.58<H> >-----------< 182    ( 08-14 @ 19:30 )             31.8<L>                  MEDICATIONS  (STANDING):  acetaminophen   Tablet. 975 milliGRAM(s) Oral every 6 hours  heparin  Injectable 5000 Unit(s) SubCutaneous every 12 hours  diphtheria/tetanus/pertussis (acellular) Vaccine (ADAcel) 0.5 milliLiter(s) IntraMuscular once  ferrous    sulfate 325 milliGRAM(s) Oral daily  prenatal multivitamin 1 Tablet(s) Oral daily  levothyroxine 50 MICROGram(s) Oral daily  ibuprofen  Tablet 600 milliGRAM(s) Oral every 6 hours  oxyCODONE    IR 5 milliGRAM(s) Oral every 3 hours    MEDICATIONS  (PRN):  oxyCODONE    IR 5 milliGRAM(s) Oral every 4 hours PRN Severe Pain (7 - 10)  simethicone 80 milliGRAM(s) Chew every 4 hours PRN Gas  diphenhydrAMINE   Capsule 25 milliGRAM(s) Oral every 6 hours PRN Itching  glycerin Suppository - Adult 1 Suppository(s) Rectal at bedtime PRN Constipation  docusate sodium 100 milliGRAM(s) Oral two times a day PRN Stool Softening  lanolin Ointment 1 Application(s) Topical every 3 hours PRN Sore Nipples

## 2017-08-16 NOTE — PROGRESS NOTE ADULT - ASSESSMENT
26  POD#2 from pLTCS for NRFHT, postpartum course complicated by fever 38.5 8/ @ 7pm, currently afebrile

## 2017-08-17 RX ORDER — IBUPROFEN 200 MG
1 TABLET ORAL
Qty: 0 | Refills: 0 | DISCHARGE
Start: 2017-08-17

## 2017-08-17 RX ORDER — ACETAMINOPHEN 500 MG
3 TABLET ORAL
Qty: 0 | Refills: 0 | DISCHARGE
Start: 2017-08-17

## 2017-08-17 RX ORDER — LEVOTHYROXINE SODIUM 125 MCG
1 TABLET ORAL
Qty: 0 | Refills: 0 | COMMUNITY

## 2017-08-17 RX ADMIN — OXYCODONE HYDROCHLORIDE 5 MILLIGRAM(S): 5 TABLET ORAL at 08:40

## 2017-08-17 RX ADMIN — Medication 975 MILLIGRAM(S): at 21:26

## 2017-08-17 RX ADMIN — Medication 600 MILLIGRAM(S): at 19:56

## 2017-08-17 RX ADMIN — HEPARIN SODIUM 5000 UNIT(S): 5000 INJECTION INTRAVENOUS; SUBCUTANEOUS at 23:56

## 2017-08-17 RX ADMIN — Medication 1 TABLET(S): at 12:12

## 2017-08-17 RX ADMIN — Medication 600 MILLIGRAM(S): at 20:26

## 2017-08-17 RX ADMIN — Medication 50 MICROGRAM(S): at 06:43

## 2017-08-17 RX ADMIN — Medication 600 MILLIGRAM(S): at 08:40

## 2017-08-17 RX ADMIN — HEPARIN SODIUM 5000 UNIT(S): 5000 INJECTION INTRAVENOUS; SUBCUTANEOUS at 12:00

## 2017-08-17 RX ADMIN — Medication 600 MILLIGRAM(S): at 09:10

## 2017-08-17 RX ADMIN — Medication 975 MILLIGRAM(S): at 09:15

## 2017-08-17 RX ADMIN — Medication 975 MILLIGRAM(S): at 19:56

## 2017-08-17 RX ADMIN — OXYCODONE HYDROCHLORIDE 5 MILLIGRAM(S): 5 TABLET ORAL at 09:10

## 2017-08-17 RX ADMIN — Medication 325 MILLIGRAM(S): at 12:13

## 2017-08-17 RX ADMIN — Medication 975 MILLIGRAM(S): at 08:40

## 2017-08-17 NOTE — PROGRESS NOTE ADULT - SUBJECTIVE AND OBJECTIVE BOX
SUBJECTIVE:    Pain: Controlled    Complaints: None    MILESTONES:    Alert and Oriented x 3  [ x ]  Out of bed/ ambulating. [ x ]  Flatus:   Positive [ x ]  Negative [  ]  Bowel movement  [  ] Positive [  ] Negative   Voiding [x  ] Due to void [  ]   Helms/Indwelling catheter in place [  ]  Diet: Regular [ x ]  Clears [  ]  NPO [  ]    Infant feeding:  Breast [  ]   Bottle [x  ]  Both [  ]  Feeding related issues and/or concerns:      OBJECTIVE:  T(C): 36.8 (17 @ 05:52), Max: 37 (17 @ 10:00)  HR: 81 (17 @ 05:52) (77 - 98)  BP: 129/71 (17 @ 05:52) (127/78 - 133/71)  RR: 18 (17 @ 05:52) (18 - 18)  SpO2: 100% (17 @ 05:52) (99% - 100%)  Wt(kg): --          Blood Type: O Positive    RPR: Negative    Rubella IgG: Positive (Positive=Immune, Negative=Non-Immune)        MEDICATIONS  (STANDING):  acetaminophen   Tablet. 975 milliGRAM(s) Oral every 6 hours  heparin  Injectable 5000 Unit(s) SubCutaneous every 12 hours  diphtheria/tetanus/pertussis (acellular) Vaccine (ADAcel) 0.5 milliLiter(s) IntraMuscular once  ferrous    sulfate 325 milliGRAM(s) Oral daily  prenatal multivitamin 1 Tablet(s) Oral daily  levothyroxine 50 MICROGram(s) Oral daily  ibuprofen  Tablet 600 milliGRAM(s) Oral every 6 hours  oxyCODONE    IR 5 milliGRAM(s) Oral every 3 hours    MEDICATIONS  (PRN):  simethicone 80 milliGRAM(s) Chew every 4 hours PRN Gas  diphenhydrAMINE   Capsule 25 milliGRAM(s) Oral every 6 hours PRN Itching  glycerin Suppository - Adult 1 Suppository(s) Rectal at bedtime PRN Constipation  docusate sodium 100 milliGRAM(s) Oral two times a day PRN Stool Softening  lanolin Ointment 1 Application(s) Topical every 3 hours PRN Sore Nipples  oxyCODONE    IR 5 milliGRAM(s) Oral every 4 hours PRN Severe Pain (7 - 10)  benzocaine 15 mG/menthol 3.6 mG Lozenge 1 Lozenge Oral three times a day PRN Sore Throat        ASSESSMENT:    26y     G  1    P 1001        PO Day#  3        Delivery: Primary [x  ]    Repeat [  ]                                         Indication of procedure: Abnormal Fetal Status    Condition: Stable    Past Medical History significant for: HPI: Hx. GDM, Endometriosis, Hypothyroidism      Current Issues:    Breasts:  Soft [x  ]   Engorged [  ]  Nipples:  Abdomen: Soft [ x ]   Distended [  ] Nontender [  ]   Bowel sounds :  Present [  ]  Absent [  ]   Fundus:  Firm [x  ]  Boggy [  ]  Abdominal incision: Clean, Dry and Intact [x  ]  Staples [  ] Steri Strips [x  ] Dermabond [  ] Sutures [  ]    Patient wearing abdominal binder for support.    Vaginal: Lochia:  Heavy [  ]  Moderate [ x ]   Scant [  ]  Extremities: Edema [  ] Negative Neftali's Sign [  ] Nontender Marino  [ x ] Positive pedal pulses [  ]    Other relevant physical exam findings:      PLAN:    Plan: Increase ambulation, analgesia PRN and pain medication protocol standing oxycodone, ibuprofen and acetaminophen.    Diet: Regular diet    Continue routine post-operative and postpartum care.  For repeat glucose testing in 6 weeks.    Discharge Planning [ x ]    For discharge Today  [ x   ]    Consults:  Social Work [  ]  Lactation [ x ]  Other [         ] SUBJECTIVE:    Pain: Controlled    Complaints: None    MILESTONES:    Alert and Oriented x 3  [ x ]  Out of bed/ ambulating. [ x ]  Flatus:   Positive [ x ]  Negative [  ]  Bowel movement  [  ] Positive [  ] Negative   Voiding [x  ] Due to void [  ]   Helms/Indwelling catheter in place [  ]  Diet: Regular [ x ]  Clears [  ]  NPO [  ]    Infant feeding:  Breast [  ]   Bottle [x  ]  Both [  ]  Feeding related issues and/or concerns:      OBJECTIVE:  T(C): 36.8 (17 @ 05:52), Max: 37 (17 @ 10:00)  HR: 81 (17 @ 05:52) (77 - 98)  BP: 129/71 (17 @ 05:52) (127/78 - 133/71)  RR: 18 (17 @ 05:52) (18 - 18)  SpO2: 100% (17 @ 05:52) (99% - 100%)  Wt(kg): --          Blood Type: O Positive    RPR: Negative    Rubella IgG: Positive (Positive=Immune, Negative=Non-Immune)        MEDICATIONS  (STANDING):  acetaminophen   Tablet. 975 milliGRAM(s) Oral every 6 hours  heparin  Injectable 5000 Unit(s) SubCutaneous every 12 hours  diphtheria/tetanus/pertussis (acellular) Vaccine (ADAcel) 0.5 milliLiter(s) IntraMuscular once  ferrous    sulfate 325 milliGRAM(s) Oral daily  prenatal multivitamin 1 Tablet(s) Oral daily  levothyroxine 50 MICROGram(s) Oral daily  ibuprofen  Tablet 600 milliGRAM(s) Oral every 6 hours  oxyCODONE    IR 5 milliGRAM(s) Oral every 3 hours    MEDICATIONS  (PRN):  simethicone 80 milliGRAM(s) Chew every 4 hours PRN Gas  diphenhydrAMINE   Capsule 25 milliGRAM(s) Oral every 6 hours PRN Itching  glycerin Suppository - Adult 1 Suppository(s) Rectal at bedtime PRN Constipation  docusate sodium 100 milliGRAM(s) Oral two times a day PRN Stool Softening  lanolin Ointment 1 Application(s) Topical every 3 hours PRN Sore Nipples  oxyCODONE    IR 5 milliGRAM(s) Oral every 4 hours PRN Severe Pain (7 - 10)  benzocaine 15 mG/menthol 3.6 mG Lozenge 1 Lozenge Oral three times a day PRN Sore Throat        ASSESSMENT:    26y     G  1    P 1001        PO Day#  3        Delivery: Primary [x  ]    Repeat [  ]                                         Indication of procedure: Abnormal Fetal Status    Condition: Stable    Past Medical History significant for: HPI: Hx. GDM, Endometriosis, Hypothyroidism  S/P PP Temp/Antibiotics      Current Issues:    Breasts:  Soft [x  ]   Engorged [  ]  Nipples:  Abdomen: Soft [ x ]   Distended [  ] Nontender [  ]   Bowel sounds :  Present [  ]  Absent [  ]   Fundus:  Firm [x  ]  Boggy [  ]  Abdominal incision: Clean, Dry and Intact [x  ]  Staples [  ] Steri Strips [x  ] Dermabond [  ] Sutures [  ]    Patient wearing abdominal binder for support.    Vaginal: Lochia:  Heavy [  ]  Moderate [ x ]   Scant [  ]  Extremities: Edema [  ] Negative Neftali's Sign [  ] Nontender Marino  [ x ] Positive pedal pulses [  ]    Other relevant physical exam findings:      PLAN:    Plan: Increase ambulation, analgesia PRN and pain medication protocol standing oxycodone, ibuprofen and acetaminophen.    Diet: Regular diet    Continue routine post-operative and postpartum care.  For repeat glucose testing in 6 weeks.    Discharge Planning [ x ]    For discharge Today  [ x   ]    Consults:  Social Work [  ]  Lactation [ x ]  Other [         ] SUBJECTIVE:    Pain: Controlled    Complaints: C/O "chest hurts"    MILESTONES:    Alert and Oriented x 3  [ x ]  Out of bed/ ambulating. [ x ]  Flatus:   Positive [ x ]  Negative [  ]  Bowel movement  [  ] Positive [  ] Negative   Voiding [x  ] Due to void [  ]   Helms/Indwelling catheter in place [  ]  Diet: Regular [ x ]  Clears [  ]  NPO [  ]    Infant feeding:  Breast [  ]   Bottle [x  ]  Both [ x ]    Feeding related issues and/or concerns: Pt and partner indicated that she wants to breastfeed, but nothing is coming out. Breasts are engorged. Encouraged to pump/feed/warm compress. Refer to Lactation.      OBJECTIVE:  T(C): 36.8 (17 @ 05:52), Max: 37 (17 @ 10:00)  HR: 81 (17 @ 05:52) (77 - 98)  BP: 129/71 (17 @ 05:52) (127/78 - 133/71)  RR: 18 (17 @ 05:52) (18 - 18)  SpO2: 100% (17 @ 05:52) (99% - 100%)  Wt(kg): --          Blood Type: O Positive    RPR: Negative    Rubella IgG: Positive (Positive=Immune, Negative=Non-Immune)        MEDICATIONS  (STANDING):  acetaminophen   Tablet. 975 milliGRAM(s) Oral every 6 hours  heparin  Injectable 5000 Unit(s) SubCutaneous every 12 hours  diphtheria/tetanus/pertussis (acellular) Vaccine (ADAcel) 0.5 milliLiter(s) IntraMuscular once  ferrous    sulfate 325 milliGRAM(s) Oral daily  prenatal multivitamin 1 Tablet(s) Oral daily  levothyroxine 50 MICROGram(s) Oral daily  ibuprofen  Tablet 600 milliGRAM(s) Oral every 6 hours  oxyCODONE    IR 5 milliGRAM(s) Oral every 3 hours    MEDICATIONS  (PRN):  simethicone 80 milliGRAM(s) Chew every 4 hours PRN Gas  diphenhydrAMINE   Capsule 25 milliGRAM(s) Oral every 6 hours PRN Itching  glycerin Suppository - Adult 1 Suppository(s) Rectal at bedtime PRN Constipation  docusate sodium 100 milliGRAM(s) Oral two times a day PRN Stool Softening  lanolin Ointment 1 Application(s) Topical every 3 hours PRN Sore Nipples  oxyCODONE    IR 5 milliGRAM(s) Oral every 4 hours PRN Severe Pain (7 - 10)  benzocaine 15 mG/menthol 3.6 mG Lozenge 1 Lozenge Oral three times a day PRN Sore Throat        ASSESSMENT:    26y     G  1    P 1001        PO Day#  3        Delivery: Primary [x  ]    Repeat [  ]                                         Indication of procedure: Abnormal Fetal Status    Condition: Stable    Past Medical History significant for: HPI: Hx. GDM, Endometriosis, Hypothyroidism  S/P PP Temp/Antibiotics      Current Issues:    Breasts:  Soft [  ]   Engorged [ x ]  Nipples:  Abdomen: Soft [ x ]   Distended [  ] Nontender [  ]   Bowel sounds :  Present [  ]  Absent [  ]   Fundus:  Firm [x  ]  Boggy [  ]  Abdominal incision: Clean, Dry and Intact [x  ]  Staples [  ] Steri Strips [x  ] Dermabond [  ] Sutures [  ]    Patient wearing abdominal binder for support.    Vaginal: Lochia:  Heavy [  ]  Moderate [ x ]   Scant [  ]  Extremities: Edema [  ] Negative Neftali's Sign [  ] Nontender Marino  [ x ] Positive pedal pulses [  ]    Other relevant physical exam findings:      PLAN:    Plan: Increase ambulation, analgesia PRN and pain medication protocol standing oxycodone, ibuprofen and acetaminophen.    Diet: Regular diet    Continue routine post-operative and postpartum care.  For repeat glucose testing in 6 weeks.    Discharge Planning [ x ]    For discharge Today  [ x   ]    Consults:  Social Work [  ]  Lactation [ x ]  Other [         ]

## 2017-08-18 VITALS
SYSTOLIC BLOOD PRESSURE: 134 MMHG | RESPIRATION RATE: 18 BRPM | TEMPERATURE: 99 F | HEART RATE: 81 BPM | DIASTOLIC BLOOD PRESSURE: 77 MMHG | OXYGEN SATURATION: 100 %

## 2017-08-18 RX ADMIN — Medication 50 MICROGRAM(S): at 06:10

## 2017-08-18 RX ADMIN — HEPARIN SODIUM 5000 UNIT(S): 5000 INJECTION INTRAVENOUS; SUBCUTANEOUS at 13:50

## 2017-08-18 NOTE — PROGRESS NOTE ADULT - SUBJECTIVE AND OBJECTIVE BOX
Patient assessed at 0900. Patient preferred language is Hebrew. Patient refused interpretor services and wanted her , Ermias, to translate.   Subjective  Pain: No pain at the time of assessment. Pain being managed well by pain management protocol  Complaints: None  Milestones:  Alert and orientedx3. Out of bed ambulating. Positive flatus. Negative bowel movement. Patient denies the urge to have a bowel movement. Voiding.  Tolerating a regular diet.  Infant feeding: Breastfeeding and formula feeding  Feeding related issues and/or concerns: None    Objective  Vital Signs:  Vital Signs Last 24 Hrs  T(C): 37.1 (18 Aug 2017 06:10), Max: 37.1 (17 Aug 2017 23:18)  T(F): 98.7 (18 Aug 2017 06:10), Max: 98.7 (17 Aug 2017 23:18)  HR: 81 (18 Aug 2017 06:10) (81 - 91)  BP: 134/77 (18 Aug 2017 06:10) (129/77 - 134/77)  BP(mean): --  RR: 18 (18 Aug 2017 06:10) (18 - 18)  SpO2: 100% (18 Aug 2017 06:10) (98% - 100%)    Labs:  17 @ 1930 WBC 11.58, RBC 4.09, Hgb 10.1, Hct 31.8, Plt 182   Blood Type: Opositive  Rubella: Immune  RPR: nonreactive    Assessment  27y/o     Day#4 primary post-operative  section delivery for abnormal fetal status, abnormal FHR CatII                                       Condition: Stable  Past Medical History significant for: gestational diabetes diet controlled  Current Issues: Had a postpartum temperature on 17 @ 0700 38.5 celsius s/p gentamicin and clindamycin. Patient afebrile since then.   Breasts: engorged, nontender  Nipples: intact  Abdomen: Soft, nondistended and nontender. Bowel sounds present. Fundus firm  Abdominal incision: Clean, dry and intact with steri strips. Patient encouraged to wear abdominal binder for support.  Vaginal: Lochia light rubra  Extremities: No edema noted bilaterally to lower extremities, negative Neftali's Sign, nontender. Positive pedal pulses  Other relevant physical exam findings: None    Plan  Plan: Increase ambulation, analgesia PRN and pain medication protocol standing oxycodone, ibuprofen and acetaminophen.  Diet: Regular diet  Orders: None    Continue routine post-operative and postpartum care. Glucose testing in 6weeks due to history of gestational diabetes. Patient to be discharge to home today. Discussed discharge planning.

## 2017-08-19 LAB — BACTERIA BLD CULT: SIGNIFICANT CHANGE UP

## 2017-10-07 ENCOUNTER — RESULT REVIEW (OUTPATIENT)
Age: 27
End: 2017-10-07

## 2018-11-20 ENCOUNTER — EMERGENCY (EMERGENCY)
Facility: HOSPITAL | Age: 28
LOS: 1 days | Discharge: HOME | End: 2018-11-20
Attending: EMERGENCY MEDICINE | Admitting: EMERGENCY MEDICINE
Payer: MEDICAID

## 2018-11-20 VITALS
HEIGHT: 64 IN | WEIGHT: 214.95 LBS | DIASTOLIC BLOOD PRESSURE: 87 MMHG | OXYGEN SATURATION: 97 % | TEMPERATURE: 98 F | RESPIRATION RATE: 18 BRPM | HEART RATE: 87 BPM | SYSTOLIC BLOOD PRESSURE: 137 MMHG

## 2018-11-20 VITALS
HEART RATE: 77 BPM | SYSTOLIC BLOOD PRESSURE: 134 MMHG | OXYGEN SATURATION: 99 % | DIASTOLIC BLOOD PRESSURE: 87 MMHG | RESPIRATION RATE: 16 BRPM | TEMPERATURE: 98 F

## 2018-11-20 DIAGNOSIS — Z98.891 HISTORY OF UTERINE SCAR FROM PREVIOUS SURGERY: Chronic | ICD-10-CM

## 2018-11-20 LAB
ACETONE SERPL-MCNC: NEGATIVE — SIGNIFICANT CHANGE UP
ALBUMIN SERPL ELPH-MCNC: 3.4 G/DL — SIGNIFICANT CHANGE UP (ref 3.3–5)
ALP SERPL-CCNC: 76 U/L — SIGNIFICANT CHANGE UP (ref 30–120)
ALT FLD-CCNC: 23 U/L DA — SIGNIFICANT CHANGE UP (ref 10–60)
ANION GAP SERPL CALC-SCNC: 12 MMOL/L — SIGNIFICANT CHANGE UP (ref 5–17)
APPEARANCE UR: CLEAR — SIGNIFICANT CHANGE UP
AST SERPL-CCNC: 13 U/L — SIGNIFICANT CHANGE UP (ref 10–40)
BASOPHILS # BLD AUTO: 0.02 K/UL — SIGNIFICANT CHANGE UP (ref 0–0.2)
BASOPHILS NFR BLD AUTO: 0.2 % — SIGNIFICANT CHANGE UP (ref 0–2)
BILIRUB SERPL-MCNC: 0.1 MG/DL — LOW (ref 0.2–1.2)
BILIRUB UR-MCNC: NEGATIVE — SIGNIFICANT CHANGE UP
BUN SERPL-MCNC: 8 MG/DL — SIGNIFICANT CHANGE UP (ref 7–23)
CALCIUM SERPL-MCNC: 8.5 MG/DL — SIGNIFICANT CHANGE UP (ref 8.4–10.5)
CHLORIDE SERPL-SCNC: 103 MMOL/L — SIGNIFICANT CHANGE UP (ref 96–108)
CO2 SERPL-SCNC: 25 MMOL/L — SIGNIFICANT CHANGE UP (ref 22–31)
COLOR SPEC: YELLOW — SIGNIFICANT CHANGE UP
CREAT SERPL-MCNC: 0.91 MG/DL — SIGNIFICANT CHANGE UP (ref 0.5–1.3)
DIFF PNL FLD: NEGATIVE — SIGNIFICANT CHANGE UP
EOSINOPHIL # BLD AUTO: 0.09 K/UL — SIGNIFICANT CHANGE UP (ref 0–0.5)
EOSINOPHIL NFR BLD AUTO: 1.1 % — SIGNIFICANT CHANGE UP (ref 0–6)
GLUCOSE SERPL-MCNC: 122 MG/DL — HIGH (ref 70–99)
GLUCOSE UR QL: NEGATIVE MG/DL — SIGNIFICANT CHANGE UP
HCG UR QL: POSITIVE
HCT VFR BLD CALC: 37.2 % — SIGNIFICANT CHANGE UP (ref 34.5–45)
HGB BLD-MCNC: 12.4 G/DL — SIGNIFICANT CHANGE UP (ref 11.5–15.5)
IMM GRANULOCYTES NFR BLD AUTO: 0.2 % — SIGNIFICANT CHANGE UP (ref 0–1.5)
KETONES UR-MCNC: NEGATIVE — SIGNIFICANT CHANGE UP
LEUKOCYTE ESTERASE UR-ACNC: ABNORMAL
LYMPHOCYTES # BLD AUTO: 3.31 K/UL — HIGH (ref 1–3.3)
LYMPHOCYTES # BLD AUTO: 38.6 % — SIGNIFICANT CHANGE UP (ref 13–44)
MAGNESIUM SERPL-MCNC: 2 MG/DL — SIGNIFICANT CHANGE UP (ref 1.6–2.6)
MCHC RBC-ENTMCNC: 27 PG — SIGNIFICANT CHANGE UP (ref 27–34)
MCHC RBC-ENTMCNC: 33.3 GM/DL — SIGNIFICANT CHANGE UP (ref 32–36)
MCV RBC AUTO: 80.9 FL — SIGNIFICANT CHANGE UP (ref 80–100)
MONOCYTES # BLD AUTO: 0.55 K/UL — SIGNIFICANT CHANGE UP (ref 0–0.9)
MONOCYTES NFR BLD AUTO: 6.4 % — SIGNIFICANT CHANGE UP (ref 2–14)
NEUTROPHILS # BLD AUTO: 4.58 K/UL — SIGNIFICANT CHANGE UP (ref 1.8–7.4)
NEUTROPHILS NFR BLD AUTO: 53.5 % — SIGNIFICANT CHANGE UP (ref 43–77)
NITRITE UR-MCNC: NEGATIVE — SIGNIFICANT CHANGE UP
PH UR: 8 — SIGNIFICANT CHANGE UP (ref 5–8)
PLATELET # BLD AUTO: 231 K/UL — SIGNIFICANT CHANGE UP (ref 150–400)
POTASSIUM SERPL-MCNC: 3.6 MMOL/L — SIGNIFICANT CHANGE UP (ref 3.5–5.3)
POTASSIUM SERPL-SCNC: 3.6 MMOL/L — SIGNIFICANT CHANGE UP (ref 3.5–5.3)
PROT SERPL-MCNC: 7.1 G/DL — SIGNIFICANT CHANGE UP (ref 6–8.3)
PROT UR-MCNC: NEGATIVE MG/DL — SIGNIFICANT CHANGE UP
RBC # BLD: 4.6 M/UL — SIGNIFICANT CHANGE UP (ref 3.8–5.2)
RBC # FLD: 14.6 % — HIGH (ref 10.3–14.5)
SODIUM SERPL-SCNC: 140 MMOL/L — SIGNIFICANT CHANGE UP (ref 135–145)
SP GR SPEC: 1.01 — SIGNIFICANT CHANGE UP (ref 1.01–1.02)
UROBILINOGEN FLD QL: NEGATIVE MG/DL — SIGNIFICANT CHANGE UP
WBC # BLD: 8.57 K/UL — SIGNIFICANT CHANGE UP (ref 3.8–10.5)
WBC # FLD AUTO: 8.57 K/UL — SIGNIFICANT CHANGE UP (ref 3.8–10.5)

## 2018-11-20 PROCEDURE — 96374 THER/PROPH/DIAG INJ IV PUSH: CPT

## 2018-11-20 PROCEDURE — 81025 URINE PREGNANCY TEST: CPT

## 2018-11-20 PROCEDURE — 85027 COMPLETE CBC AUTOMATED: CPT

## 2018-11-20 PROCEDURE — 99283 EMERGENCY DEPT VISIT LOW MDM: CPT

## 2018-11-20 PROCEDURE — 83735 ASSAY OF MAGNESIUM: CPT

## 2018-11-20 PROCEDURE — 99284 EMERGENCY DEPT VISIT MOD MDM: CPT | Mod: 25

## 2018-11-20 PROCEDURE — 36415 COLL VENOUS BLD VENIPUNCTURE: CPT

## 2018-11-20 PROCEDURE — 81001 URINALYSIS AUTO W/SCOPE: CPT

## 2018-11-20 PROCEDURE — 82009 KETONE BODYS QUAL: CPT

## 2018-11-20 PROCEDURE — 84443 ASSAY THYROID STIM HORMONE: CPT

## 2018-11-20 PROCEDURE — 80053 COMPREHEN METABOLIC PANEL: CPT

## 2018-11-20 RX ORDER — SODIUM CHLORIDE 9 MG/ML
999 INJECTION INTRAMUSCULAR; INTRAVENOUS; SUBCUTANEOUS ONCE
Qty: 0 | Refills: 0 | Status: COMPLETED | OUTPATIENT
Start: 2018-11-20 | End: 2018-11-20

## 2018-11-20 RX ORDER — METOCLOPRAMIDE HCL 10 MG
10 TABLET ORAL ONCE
Qty: 0 | Refills: 0 | Status: COMPLETED | OUTPATIENT
Start: 2018-11-20 | End: 2018-11-20

## 2018-11-20 RX ADMIN — Medication 10 MILLIGRAM(S): at 22:20

## 2018-11-20 RX ADMIN — SODIUM CHLORIDE 999 MILLILITER(S): 9 INJECTION INTRAMUSCULAR; INTRAVENOUS; SUBCUTANEOUS at 22:20

## 2018-11-20 NOTE — ED ADULT NURSE NOTE - NSIMPLEMENTINTERV_GEN_ALL_ED
Implemented All Universal Safety Interventions:  [place] to call system. Call bell, personal items and telephone within reach. Instruct patient to call for assistance. Room bathroom lighting operational. Non-slip footwear when patient is off stretcher. Physically safe environment: no spills, clutter or unnecessary equipment. Stretcher in lowest position, wheels locked, appropriate side rails in place.

## 2018-11-20 NOTE — ED PROVIDER NOTE - OBJECTIVE STATEMENT
Per , 29 y/o F pt with PSHx of  presents to the ED c/o constant HA, SOB, chest pressure since this morning. Pt traveled to [place] 6 months ago, is currently pregnant. She took a home pregnancy test and was seen at the doctor yesterday who states she is 6 weeks pregnant. Confirms having HA in the past, possible dehydration, nausea secondary to HA, right arm pain. She had her flu shot done yesterday. Denies decreased eating/drinking, cough, congestion, dysuria, difficulty urinating, abd pain. No further complaints at this time.  LNMP: 2018   P:1 A:0

## 2018-11-20 NOTE — ED ADULT TRIAGE NOTE - CHIEF COMPLAINT QUOTE
"I have dizziness and headache and SOB since this morning"   has flu shot yesterday, and 6 weeks pregnant as per pt's

## 2018-11-20 NOTE — ED PROVIDER NOTE - MEDICAL DECISION MAKING DETAILS
27 y/o F pt presents to the ED c/o constant HA, SOB, chest pressure since this morning. Will do labs then reassess.

## 2018-11-20 NOTE — ED ADULT NURSE NOTE - OBJECTIVE STATEMENT
Pt states she woke up with a headache this morning, and although she has had this headache in the past she is concerned because she is pregnant. Pt states she is not drinking a lot.

## 2018-11-20 NOTE — ED PROVIDER NOTE - PHYSICAL EXAMINATION
Constitutional : Pt obese, hyperventilating, appears comfortably, talking in full sentences  Head :NC AT , no swelling  Eyes :eomi, no swelling  Mouth :mm dry, bilateral TM no erythema  Neck : supple, trachea in midline  Chest :Marino air entry, symm chest expansion, no distress  Heart :S1 S2 distant  Abdomen :abd soft, non tender  Musc/Skel :ext no swelling, no deformity, no spine tenderness, distal pulses present, received flu shot on right arm, no arm tenderness  Neuro  :AAO 3 no focal deficits Constitutional : Pt obese, hyperventilating, appears comfortably, talking in full sentences  Head :NC AT , no swelling  Eyes :eomi, no swelling  Mouth :mm dry, bilateral TM no erythema  Neck : supple, trachea in midline  Chest :Marino air entry, symm chest expansion, no distress  Heart :S1 S2 distant  Abdomen :abd soft, non tender  Musc/Skel :ext no swelling, no deformity, no spine tenderness, distal pulses present, received flu shot on right arm, no arm tenderness  Neuro: AAO*3, follows commands  motor marino upper and lower ext 5/5  sensory symm and intact  CN 2-12 grossly intact  no ataxia, no nystagmus  finger to nose, symm marino, no pronator drift

## 2018-11-20 NOTE — ED ADULT NURSE REASSESSMENT NOTE - NS ED NURSE REASSESS COMMENT FT1
Pt is breathing comfortably at this time, no further SOB. Pt reports partial relief of HA, is drinking sips of water.

## 2018-11-20 NOTE — ED PROVIDER NOTE - NS ED ROS FT
no weight change, no fever or chills  +recent travel, no recent abox, no sick contacts  no recent change in medications  no rash, no bruises  no visual changes no eye discharge  no cough cold or congestion,   +sob, +chest pressure  no orthopnea, no pnd  no abd pain, +nausea, no v/d  no hematuria, no change in urinary habits  +right arm pain, no deformity  +headache, no paresthesia

## 2018-11-21 LAB — TSH SERPL-MCNC: 3.65 UIU/ML — SIGNIFICANT CHANGE UP (ref 0.27–4.2)

## 2018-12-31 ENCOUNTER — ASOB RESULT (OUTPATIENT)
Age: 28
End: 2018-12-31

## 2018-12-31 ENCOUNTER — APPOINTMENT (OUTPATIENT)
Dept: ANTEPARTUM | Facility: CLINIC | Age: 28
End: 2018-12-31
Payer: COMMERCIAL

## 2018-12-31 PROCEDURE — 76813 OB US NUCHAL MEAS 1 GEST: CPT

## 2018-12-31 PROCEDURE — 36416 COLLJ CAPILLARY BLOOD SPEC: CPT

## 2018-12-31 PROCEDURE — 76801 OB US < 14 WKS SINGLE FETUS: CPT

## 2019-01-29 ENCOUNTER — ASOB RESULT (OUTPATIENT)
Age: 29
End: 2019-01-29

## 2019-01-29 ENCOUNTER — APPOINTMENT (OUTPATIENT)
Dept: ANTEPARTUM | Facility: CLINIC | Age: 29
End: 2019-01-29
Payer: COMMERCIAL

## 2019-01-29 PROCEDURE — 76811 OB US DETAILED SNGL FETUS: CPT

## 2019-01-29 PROCEDURE — 99212 OFFICE O/P EST SF 10 MIN: CPT | Mod: 25

## 2019-02-13 ENCOUNTER — ASOB RESULT (OUTPATIENT)
Age: 29
End: 2019-02-13

## 2019-02-13 ENCOUNTER — APPOINTMENT (OUTPATIENT)
Dept: ANTEPARTUM | Facility: CLINIC | Age: 29
End: 2019-02-13
Payer: COMMERCIAL

## 2019-02-13 PROCEDURE — 76819 FETAL BIOPHYS PROFIL W/O NST: CPT

## 2019-02-13 PROCEDURE — 76816 OB US FOLLOW-UP PER FETUS: CPT

## 2019-02-22 ENCOUNTER — APPOINTMENT (OUTPATIENT)
Dept: ANTEPARTUM | Facility: CLINIC | Age: 29
End: 2019-02-22
Payer: COMMERCIAL

## 2019-02-22 ENCOUNTER — ASOB RESULT (OUTPATIENT)
Age: 29
End: 2019-02-22

## 2019-02-22 PROCEDURE — 76816 OB US FOLLOW-UP PER FETUS: CPT

## 2019-03-06 ENCOUNTER — APPOINTMENT (OUTPATIENT)
Dept: ANTEPARTUM | Facility: CLINIC | Age: 29
End: 2019-03-06
Payer: COMMERCIAL

## 2019-03-06 ENCOUNTER — ASOB RESULT (OUTPATIENT)
Age: 29
End: 2019-03-06

## 2019-03-06 PROCEDURE — 76825 ECHO EXAM OF FETAL HEART: CPT

## 2019-03-06 PROCEDURE — 76827 ECHO EXAM OF FETAL HEART: CPT

## 2019-03-27 ENCOUNTER — ASOB RESULT (OUTPATIENT)
Age: 29
End: 2019-03-27

## 2019-03-27 ENCOUNTER — APPOINTMENT (OUTPATIENT)
Dept: ANTEPARTUM | Facility: CLINIC | Age: 29
End: 2019-03-27
Payer: MEDICAID

## 2019-03-27 PROCEDURE — 76816 OB US FOLLOW-UP PER FETUS: CPT

## 2019-04-01 ENCOUNTER — APPOINTMENT (OUTPATIENT)
Dept: ANTEPARTUM | Facility: CLINIC | Age: 29
End: 2019-04-01

## 2019-04-29 ENCOUNTER — ASOB RESULT (OUTPATIENT)
Age: 29
End: 2019-04-29

## 2019-04-29 ENCOUNTER — APPOINTMENT (OUTPATIENT)
Dept: ANTEPARTUM | Facility: CLINIC | Age: 29
End: 2019-04-29
Payer: MEDICAID

## 2019-04-29 PROCEDURE — 76816 OB US FOLLOW-UP PER FETUS: CPT

## 2019-04-29 PROCEDURE — 76819 FETAL BIOPHYS PROFIL W/O NST: CPT

## 2019-04-29 PROCEDURE — 76817 TRANSVAGINAL US OBSTETRIC: CPT

## 2019-05-06 ENCOUNTER — ASOB RESULT (OUTPATIENT)
Age: 29
End: 2019-05-06

## 2019-05-06 ENCOUNTER — OUTPATIENT (OUTPATIENT)
Dept: OUTPATIENT SERVICES | Facility: HOSPITAL | Age: 29
LOS: 1 days | End: 2019-05-06

## 2019-05-06 ENCOUNTER — APPOINTMENT (OUTPATIENT)
Dept: ANTEPARTUM | Facility: CLINIC | Age: 29
End: 2019-05-06
Payer: MEDICAID

## 2019-05-06 ENCOUNTER — APPOINTMENT (OUTPATIENT)
Dept: ANTEPARTUM | Facility: HOSPITAL | Age: 29
End: 2019-05-06

## 2019-05-06 DIAGNOSIS — Z98.891 HISTORY OF UTERINE SCAR FROM PREVIOUS SURGERY: Chronic | ICD-10-CM

## 2019-05-06 PROCEDURE — 76818 FETAL BIOPHYS PROFILE W/NST: CPT | Mod: 26

## 2019-05-13 ENCOUNTER — ASOB RESULT (OUTPATIENT)
Age: 29
End: 2019-05-13

## 2019-05-13 ENCOUNTER — APPOINTMENT (OUTPATIENT)
Dept: ANTEPARTUM | Facility: CLINIC | Age: 29
End: 2019-05-13
Payer: MEDICAID

## 2019-05-13 ENCOUNTER — APPOINTMENT (OUTPATIENT)
Dept: ANTEPARTUM | Facility: HOSPITAL | Age: 29
End: 2019-05-13

## 2019-05-13 PROCEDURE — 99213 OFFICE O/P EST LOW 20 MIN: CPT | Mod: 25,TH

## 2019-05-13 PROCEDURE — 76818 FETAL BIOPHYS PROFILE W/NST: CPT | Mod: 26

## 2019-05-14 ENCOUNTER — APPOINTMENT (OUTPATIENT)
Dept: MATERNAL FETAL MEDICINE | Facility: CLINIC | Age: 29
End: 2019-05-14

## 2019-05-15 ENCOUNTER — APPOINTMENT (OUTPATIENT)
Dept: ANTEPARTUM | Facility: CLINIC | Age: 29
End: 2019-05-15

## 2019-05-15 ENCOUNTER — APPOINTMENT (OUTPATIENT)
Dept: ANTEPARTUM | Facility: CLINIC | Age: 29
End: 2019-05-15
Payer: MEDICAID

## 2019-05-15 ENCOUNTER — ASOB RESULT (OUTPATIENT)
Age: 29
End: 2019-05-15

## 2019-05-15 PROCEDURE — 76818 FETAL BIOPHYS PROFILE W/NST: CPT | Mod: 26

## 2019-05-16 DIAGNOSIS — O24.410 GESTATIONAL DIABETES MELLITUS IN PREGNANCY, DIET CONTROLLED: ICD-10-CM

## 2019-05-16 DIAGNOSIS — O28.5 ABNORMAL CHROMOSOMAL AND GENETIC FINDING ON ANTENATAL SCREENING OF MOTHER: ICD-10-CM

## 2019-05-16 DIAGNOSIS — O10.013 PRE-EXISTING ESSENTIAL HYPERTENSION COMPLICATING PREGNANCY, THIRD TRIMESTER: ICD-10-CM

## 2019-05-21 ENCOUNTER — APPOINTMENT (OUTPATIENT)
Dept: ANTEPARTUM | Facility: HOSPITAL | Age: 29
End: 2019-05-21

## 2019-05-22 ENCOUNTER — APPOINTMENT (OUTPATIENT)
Dept: ANTEPARTUM | Facility: CLINIC | Age: 29
End: 2019-05-22
Payer: MEDICAID

## 2019-05-22 ENCOUNTER — APPOINTMENT (OUTPATIENT)
Dept: ANTEPARTUM | Facility: CLINIC | Age: 29
End: 2019-05-22

## 2019-05-22 ENCOUNTER — ASOB RESULT (OUTPATIENT)
Age: 29
End: 2019-05-22

## 2019-05-22 ENCOUNTER — OUTPATIENT (OUTPATIENT)
Dept: OUTPATIENT SERVICES | Facility: HOSPITAL | Age: 29
LOS: 1 days | End: 2019-05-22

## 2019-05-22 DIAGNOSIS — Z98.891 HISTORY OF UTERINE SCAR FROM PREVIOUS SURGERY: Chronic | ICD-10-CM

## 2019-05-22 PROCEDURE — 76816 OB US FOLLOW-UP PER FETUS: CPT

## 2019-05-22 PROCEDURE — 76818 FETAL BIOPHYS PROFILE W/NST: CPT | Mod: 26

## 2019-05-30 ENCOUNTER — OUTPATIENT (OUTPATIENT)
Dept: OUTPATIENT SERVICES | Facility: HOSPITAL | Age: 29
LOS: 1 days | End: 2019-05-30

## 2019-05-30 ENCOUNTER — ASOB RESULT (OUTPATIENT)
Age: 29
End: 2019-05-30

## 2019-05-30 ENCOUNTER — APPOINTMENT (OUTPATIENT)
Dept: ANTEPARTUM | Facility: HOSPITAL | Age: 29
End: 2019-05-30

## 2019-05-30 ENCOUNTER — APPOINTMENT (OUTPATIENT)
Dept: ANTEPARTUM | Facility: CLINIC | Age: 29
End: 2019-05-30
Payer: MEDICAID

## 2019-05-30 DIAGNOSIS — Z98.891 HISTORY OF UTERINE SCAR FROM PREVIOUS SURGERY: Chronic | ICD-10-CM

## 2019-05-30 PROCEDURE — 76818 FETAL BIOPHYS PROFILE W/NST: CPT | Mod: 26

## 2019-06-06 ENCOUNTER — APPOINTMENT (OUTPATIENT)
Dept: ANTEPARTUM | Facility: CLINIC | Age: 29
End: 2019-06-06
Payer: MEDICAID

## 2019-06-06 ENCOUNTER — OUTPATIENT (OUTPATIENT)
Dept: OUTPATIENT SERVICES | Facility: HOSPITAL | Age: 29
LOS: 1 days | Discharge: HOME | End: 2019-06-06
Payer: MEDICAID

## 2019-06-06 ENCOUNTER — ASOB RESULT (OUTPATIENT)
Age: 29
End: 2019-06-06

## 2019-06-06 ENCOUNTER — APPOINTMENT (OUTPATIENT)
Dept: ANTEPARTUM | Facility: HOSPITAL | Age: 29
End: 2019-06-06

## 2019-06-06 ENCOUNTER — OUTPATIENT (OUTPATIENT)
Dept: OUTPATIENT SERVICES | Facility: HOSPITAL | Age: 29
LOS: 1 days | End: 2019-06-06

## 2019-06-06 VITALS
HEART RATE: 85 BPM | DIASTOLIC BLOOD PRESSURE: 74 MMHG | TEMPERATURE: 99 F | SYSTOLIC BLOOD PRESSURE: 122 MMHG | RESPIRATION RATE: 18 BRPM

## 2019-06-06 VITALS — SYSTOLIC BLOOD PRESSURE: 123 MMHG | DIASTOLIC BLOOD PRESSURE: 75 MMHG | HEART RATE: 88 BPM

## 2019-06-06 DIAGNOSIS — O26.899 OTHER SPECIFIED PREGNANCY RELATED CONDITIONS, UNSPECIFIED TRIMESTER: ICD-10-CM

## 2019-06-06 DIAGNOSIS — Z3A.00 WEEKS OF GESTATION OF PREGNANCY NOT SPECIFIED: ICD-10-CM

## 2019-06-06 DIAGNOSIS — Z98.891 HISTORY OF UTERINE SCAR FROM PREVIOUS SURGERY: Chronic | ICD-10-CM

## 2019-06-06 DIAGNOSIS — N97.1 FEMALE INFERTILITY OF TUBAL ORIGIN: Chronic | ICD-10-CM

## 2019-06-06 PROCEDURE — 59025 FETAL NON-STRESS TEST: CPT | Mod: 26

## 2019-06-06 PROCEDURE — 99213 OFFICE O/P EST LOW 20 MIN: CPT | Mod: 25,TH

## 2019-06-06 PROCEDURE — 76818 FETAL BIOPHYS PROFILE W/NST: CPT | Mod: 26

## 2019-06-06 NOTE — OB PROVIDER TRIAGE NOTE - NSHPPHYSICALEXAM_GEN_ALL_CORE
ATU scan BPP:8/8 IFEOMA:9.4  FHR:130bpm, moderate variability, accels noted, no decels   No contractions on TOCO

## 2019-06-06 NOTE — OB RN TRIAGE NOTE - PSH
H/O  section   boy 7-8 GDM  Obstructed fallopian tubes  2015 H/O  section   boy 7-8 GDM arrest of descent  Obstructed fallopian tubes  2015

## 2019-06-06 NOTE — OB PROVIDER TRIAGE NOTE - ADDITIONAL INSTRUCTIONS
Fetal wellbeing reassured. Discussed findings with Dr. Valiente. Pt. d/c'd home. Pt. to follow up with next OB appointment. Increase PO hydration encouraged. Daily kick counts reviewed. Pt. instructed to return to triage with increase abdominal contractions, LOF, VB or decrease FM.

## 2019-06-06 NOTE — OB PROVIDER TRIAGE NOTE - NSOBPROVIDERNOTE_OBGYN_ALL_OB_FT
Dr. Gonzalez's pt. is a 27y/o @36.2wks . Pt. was sent in from the ATU for nonreactive tracing. Pt. denies abdominal contractions , LOF and VB. Pt. reports FM.     AP: GDMA1  Medical Hx: Chronic HTN  Surgical Hx:   OBGYN Hx: Primary  17 7-5 GDMA1  Meds:PNV    FHR:135bpm, moderate variability, accels noted, no decels  No contractions on TOCO    Fetal wellbeing reassured. Discussed findings with Dr. Valiente. Pt. d/c'd home. Pt. to follow up with next OB appointment. Increase PO hydration encouraged. Daily kick counts reviewed. Pt. instructed to return to triage with increase abdominal contractions, LOF, VB or decrease FM.

## 2019-06-07 DIAGNOSIS — Z3A.35 35 WEEKS GESTATION OF PREGNANCY: ICD-10-CM

## 2019-06-07 DIAGNOSIS — O24.410 GESTATIONAL DIABETES MELLITUS IN PREGNANCY, DIET CONTROLLED: ICD-10-CM

## 2019-06-07 DIAGNOSIS — O10.013 PRE-EXISTING ESSENTIAL HYPERTENSION COMPLICATING PREGNANCY, THIRD TRIMESTER: ICD-10-CM

## 2019-06-07 DIAGNOSIS — O28.5 ABNORMAL CHROMOSOMAL AND GENETIC FINDING ON ANTENATAL SCREENING OF MOTHER: ICD-10-CM

## 2019-06-07 DIAGNOSIS — O99.213 OBESITY COMPLICATING PREGNANCY, THIRD TRIMESTER: ICD-10-CM

## 2019-06-10 ENCOUNTER — ASOB RESULT (OUTPATIENT)
Age: 29
End: 2019-06-10

## 2019-06-10 ENCOUNTER — OUTPATIENT (OUTPATIENT)
Dept: OUTPATIENT SERVICES | Facility: HOSPITAL | Age: 29
LOS: 1 days | End: 2019-06-10

## 2019-06-10 ENCOUNTER — APPOINTMENT (OUTPATIENT)
Dept: ANTEPARTUM | Facility: CLINIC | Age: 29
End: 2019-06-10
Payer: MEDICAID

## 2019-06-10 DIAGNOSIS — N97.1 FEMALE INFERTILITY OF TUBAL ORIGIN: Chronic | ICD-10-CM

## 2019-06-10 DIAGNOSIS — Z98.891 HISTORY OF UTERINE SCAR FROM PREVIOUS SURGERY: Chronic | ICD-10-CM

## 2019-06-10 PROCEDURE — 59025 FETAL NON-STRESS TEST: CPT | Mod: 26

## 2019-06-12 ENCOUNTER — OUTPATIENT (OUTPATIENT)
Dept: OUTPATIENT SERVICES | Facility: HOSPITAL | Age: 29
LOS: 1 days | End: 2019-06-12
Payer: MEDICAID

## 2019-06-12 VITALS
TEMPERATURE: 98 F | OXYGEN SATURATION: 99 % | RESPIRATION RATE: 16 BRPM | WEIGHT: 212.08 LBS | HEIGHT: 61.5 IN | DIASTOLIC BLOOD PRESSURE: 80 MMHG | SYSTOLIC BLOOD PRESSURE: 130 MMHG | HEART RATE: 98 BPM

## 2019-06-12 DIAGNOSIS — I10 ESSENTIAL (PRIMARY) HYPERTENSION: ICD-10-CM

## 2019-06-12 DIAGNOSIS — Z98.891 HISTORY OF UTERINE SCAR FROM PREVIOUS SURGERY: Chronic | ICD-10-CM

## 2019-06-12 DIAGNOSIS — Z34.90 ENCOUNTER FOR SUPERVISION OF NORMAL PREGNANCY, UNSPECIFIED, UNSPECIFIED TRIMESTER: ICD-10-CM

## 2019-06-12 DIAGNOSIS — N97.1 FEMALE INFERTILITY OF TUBAL ORIGIN: Chronic | ICD-10-CM

## 2019-06-12 LAB
ANION GAP SERPL CALC-SCNC: 14 MMO/L — SIGNIFICANT CHANGE UP (ref 7–14)
APPEARANCE UR: SIGNIFICANT CHANGE UP
BACTERIA # UR AUTO: NEGATIVE — SIGNIFICANT CHANGE UP
BILIRUB UR-MCNC: NEGATIVE — SIGNIFICANT CHANGE UP
BLOOD UR QL VISUAL: NEGATIVE — SIGNIFICANT CHANGE UP
BUN SERPL-MCNC: 7 MG/DL — SIGNIFICANT CHANGE UP (ref 7–23)
CALCIUM SERPL-MCNC: 9.9 MG/DL — SIGNIFICANT CHANGE UP (ref 8.4–10.5)
CHLORIDE SERPL-SCNC: 101 MMOL/L — SIGNIFICANT CHANGE UP (ref 98–107)
CO2 SERPL-SCNC: 22 MMOL/L — SIGNIFICANT CHANGE UP (ref 22–31)
COLOR SPEC: YELLOW — SIGNIFICANT CHANGE UP
CREAT SERPL-MCNC: 0.46 MG/DL — LOW (ref 0.5–1.3)
GLUCOSE SERPL-MCNC: 98 MG/DL — SIGNIFICANT CHANGE UP (ref 70–99)
GLUCOSE UR-MCNC: NEGATIVE — SIGNIFICANT CHANGE UP
HCT VFR BLD CALC: 38 % — SIGNIFICANT CHANGE UP (ref 34.5–45)
HGB BLD-MCNC: 11.6 G/DL — SIGNIFICANT CHANGE UP (ref 11.5–15.5)
HYALINE CASTS # UR AUTO: SIGNIFICANT CHANGE UP
KETONES UR-MCNC: SIGNIFICANT CHANGE UP
LEUKOCYTE ESTERASE UR-ACNC: NEGATIVE — SIGNIFICANT CHANGE UP
MCHC RBC-ENTMCNC: 24 PG — LOW (ref 27–34)
MCHC RBC-ENTMCNC: 30.5 % — LOW (ref 32–36)
MCV RBC AUTO: 78.5 FL — LOW (ref 80–100)
NITRITE UR-MCNC: NEGATIVE — SIGNIFICANT CHANGE UP
NRBC # FLD: 0 K/UL — SIGNIFICANT CHANGE UP (ref 0–0)
PH UR: 6 — SIGNIFICANT CHANGE UP (ref 5–8)
PLATELET # BLD AUTO: 275 K/UL — SIGNIFICANT CHANGE UP (ref 150–400)
PMV BLD: 11.8 FL — SIGNIFICANT CHANGE UP (ref 7–13)
POTASSIUM SERPL-MCNC: 3.8 MMOL/L — SIGNIFICANT CHANGE UP (ref 3.5–5.3)
POTASSIUM SERPL-SCNC: 3.8 MMOL/L — SIGNIFICANT CHANGE UP (ref 3.5–5.3)
PROT UR-MCNC: 30 — SIGNIFICANT CHANGE UP
RBC # BLD: 4.84 M/UL — SIGNIFICANT CHANGE UP (ref 3.8–5.2)
RBC # FLD: 18 % — HIGH (ref 10.3–14.5)
RBC CASTS # UR COMP ASSIST: SIGNIFICANT CHANGE UP (ref 0–?)
SODIUM SERPL-SCNC: 137 MMOL/L — SIGNIFICANT CHANGE UP (ref 135–145)
SP GR SPEC: 1.03 — SIGNIFICANT CHANGE UP (ref 1–1.04)
SQUAMOUS # UR AUTO: SIGNIFICANT CHANGE UP
UROBILINOGEN FLD QL: NORMAL — SIGNIFICANT CHANGE UP
WBC # BLD: 7.66 K/UL — SIGNIFICANT CHANGE UP (ref 3.8–10.5)
WBC # FLD AUTO: 7.66 K/UL — SIGNIFICANT CHANGE UP (ref 3.8–10.5)
WBC UR QL: SIGNIFICANT CHANGE UP (ref 0–?)

## 2019-06-12 PROCEDURE — 93010 ELECTROCARDIOGRAM REPORT: CPT

## 2019-06-12 RX ORDER — SODIUM CHLORIDE 9 MG/ML
1000 INJECTION, SOLUTION INTRAVENOUS
Refills: 0 | Status: DISCONTINUED | OUTPATIENT
Start: 2019-06-17 | End: 2019-06-17

## 2019-06-12 NOTE — OB PST NOTE - NSHPPHYSICALEXAM_GEN_ALL_CORE
Constitutional: Well Developed, Well Groomed, Well Nourished, No Distress    Eyes: PERRL, EOMI, conjunctiva clear    Ears: Normal    Mouth & Gums: Normal, moist    Pharynx: No tenderness, discharge, or peritonsillar abscess    Tonsils: No Redness, discharge, tenderness, or swelling    Neck: Supple, no JVD, normal thyroid glands,     Breast: Normal shape,     Back: Normal shape, ROM intact, strength intact, no vertebral tenderness    Respiratory: Airway patent, breath sounds equal, good air movement, respiration non-labored, clear to auscultation bilateral    Cardiovascular:  Regular rate and rhythm, no rubs or murmur, normal PMI    Gastrointestinal: gravid abdomen    Extremities: No clubbing, cyanosis, or pedal edema    Vascular:  Radial Pulse normal,  DP pulse normal    Neurological: alert & oriented x 3, sensation intact, normal strength    Skin: warm and dry, normal color    Lymph Nodes: normal posterior cervical lymph node, normal anterior cervical lymph node, normal supraclavicular lymph node,     Musculoskeletal: ROM intact, no joint swelling, warmth, or calf tenderness. Normal strength    Psychiatric: normal affect, normal behavior

## 2019-06-12 NOTE — OB PST NOTE - PROBLEM SELECTOR PLAN 1
pt scheduled for repeat  on 19  Preop instructions provided. Pt verbalized understanding.    written and verbal instructions with teach back on chlorhexidine shampoo provided,  pt verbalized understanding with returned demonstration pt scheduled for repeat  on 19  Preop instructions provided. Pt verbalized understanding.    written and verbal instructions with teach back on chlorhexidine shampoo provided,  pt verbalized understanding with returned demonstration  follow OB instructions regarding aspirin use

## 2019-06-12 NOTE — OB PST NOTE - WEIGHT METHOD
Spoke with pt.   Per Dr. Teena Palacios, pt should discontinue use of Prednisone eye drops. In addition, pt should use lubricating eye drops for sensitivity to wind.    Information left on VM.   actual

## 2019-06-12 NOTE — OB PST NOTE - NSANTHOSAYNRD_GEN_A_CORE
No. EDI screening performed.  STOP BANG Legend: 0-2 = LOW Risk; 3-4 = INTERMEDIATE Risk; 5-8 = HIGH Risk

## 2019-06-12 NOTE — OB PST NOTE - NSHPREVIEWOFSYSTEMS_GEN_ALL_CORE
General: No fever, chills, sweating, anorexia, weight loss or weight gain. No polyphagia, polyurea, polydypsia, malaise, or fatigue    Skin: No rashes, itching, or dryness. No change in size/color of moles. No tumors, brittle nails, pitted nails, or hair loss    Breast: No tenderness, lumps, or nipple discharge      Ophthalmologic: No diplopia, photophobia, lacrimation, blurred Vision , or eye discharge    ENMT Symptoms: No hearing difficulty, ear pain, tinnitus, or vertigo. No sinus symptoms, nasal congestion, nasal   discharge, or nasal obstruction    Respiratory and Thorax: No wheezing, dyspnea, cough, hemoptysis, or pleuritic chest pain     Cardiovascular: No chest pain, palpitations, dyspnea on exertion, orthopnea, paroxysmal nocturnal dyspnea,   peripheral edema, or claudication    Gastrointestinal: No nausea, vomiting, diarrhea, constipation, change in bowel habits, flatulence, abdominal pain, or melena    Genitourinary/ Pelvis: No hematuria, renal colic, or flank pain.  No urine discoloration, incontinence, dysuria, or urinary hesitancy. Normal urinary frequency. No nocturia, abnormal vaginal bleeding, vaginal discharge, spotting, pelvic pain, or vaginal leakage    Musculoskeletal: No arthralgia, arthritis, joint swelling, muscle cramping, muscle weakness, neck pain, arm pain, or leg pain    Neurological: No transient paralysis, weakness, paresthesias, or seizures. No syncope, tremors, vertigo, loss of sensation, difficulty walking, loss of consciousness, hemiparesis, confusion, or facial palsy    Psychiatric: No suicidal ideation, depression, anxiety, insomnia, memory loss, paranoia, mood swings, agitation, hallucinations, or hyperactivity    Hematology: No gum bleeding, nose bleeding, or skin lumps    Lymphatic: No enlarged or tender lymph nodes. No extremity swelling    Endocrine: hx of gestational diabetes during pregnancy in 2017, No heat or cold intolerance    Immunologic: No recurrent or persistent infections

## 2019-06-12 NOTE — OB PST NOTE - FAMILY HISTORY
Father  Still living? Unknown  Diabetes mellitus, Age at diagnosis: Age Unknown  Hypertension, Age at diagnosis: Age Unknown

## 2019-06-12 NOTE — OB PST NOTE - HISTORY OF PRESENT ILLNESS
28 y.o. female , presents to PST for evaluation for Repeat  on 19   pt denies complications with current pregnancy, reports good fetal movement  2017  @ for nonprogressing labor 28 y.o. female , presents to PST for evaluation for Repeat  on 19   pt denies complications with current pregnancy, reports good fetal movement, hx of chronic hypertension  2017  @ for nonprogressing labor

## 2019-06-13 DIAGNOSIS — O99.213 OBESITY COMPLICATING PREGNANCY, THIRD TRIMESTER: ICD-10-CM

## 2019-06-13 DIAGNOSIS — O24.410 GESTATIONAL DIABETES MELLITUS IN PREGNANCY, DIET CONTROLLED: ICD-10-CM

## 2019-06-13 DIAGNOSIS — O10.013 PRE-EXISTING ESSENTIAL HYPERTENSION COMPLICATING PREGNANCY, THIRD TRIMESTER: ICD-10-CM

## 2019-06-13 DIAGNOSIS — O28.5 ABNORMAL CHROMOSOMAL AND GENETIC FINDING ON ANTENATAL SCREENING OF MOTHER: ICD-10-CM

## 2019-06-13 DIAGNOSIS — Z3A.36 36 WEEKS GESTATION OF PREGNANCY: ICD-10-CM

## 2019-06-13 LAB
BLD GP AB SCN SERPL QL: NEGATIVE — SIGNIFICANT CHANGE UP
RH IG SCN BLD-IMP: POSITIVE — SIGNIFICANT CHANGE UP
T PALLIDUM AB TITR SER: NEGATIVE — SIGNIFICANT CHANGE UP

## 2019-06-14 ENCOUNTER — APPOINTMENT (OUTPATIENT)
Dept: ANTEPARTUM | Facility: CLINIC | Age: 29
End: 2019-06-14

## 2019-06-14 ENCOUNTER — APPOINTMENT (OUTPATIENT)
Dept: ANTEPARTUM | Facility: HOSPITAL | Age: 29
End: 2019-06-14

## 2019-06-16 ENCOUNTER — TRANSCRIPTION ENCOUNTER (OUTPATIENT)
Age: 29
End: 2019-06-16

## 2019-06-17 ENCOUNTER — INPATIENT (INPATIENT)
Facility: HOSPITAL | Age: 29
LOS: 3 days | Discharge: HOME | End: 2019-06-21
Attending: STUDENT IN AN ORGANIZED HEALTH CARE EDUCATION/TRAINING PROGRAM | Admitting: STUDENT IN AN ORGANIZED HEALTH CARE EDUCATION/TRAINING PROGRAM
Payer: MEDICAID

## 2019-06-17 ENCOUNTER — TRANSCRIPTION ENCOUNTER (OUTPATIENT)
Age: 29
End: 2019-06-17

## 2019-06-17 VITALS — HEART RATE: 76 BPM | SYSTOLIC BLOOD PRESSURE: 133 MMHG | DIASTOLIC BLOOD PRESSURE: 92 MMHG

## 2019-06-17 DIAGNOSIS — O24.419 GESTATIONAL DIABETES MELLITUS IN PREGNANCY, UNSPECIFIED CONTROL: ICD-10-CM

## 2019-06-17 DIAGNOSIS — Z98.891 HISTORY OF UTERINE SCAR FROM PREVIOUS SURGERY: Chronic | ICD-10-CM

## 2019-06-17 DIAGNOSIS — O14.90 UNSPECIFIED PRE-ECLAMPSIA, UNSPECIFIED TRIMESTER: ICD-10-CM

## 2019-06-17 DIAGNOSIS — Z98.891 HISTORY OF UTERINE SCAR FROM PREVIOUS SURGERY: ICD-10-CM

## 2019-06-17 DIAGNOSIS — N97.1 FEMALE INFERTILITY OF TUBAL ORIGIN: Chronic | ICD-10-CM

## 2019-06-17 LAB
BASOPHILS # BLD AUTO: 0.01 K/UL — SIGNIFICANT CHANGE UP (ref 0–0.2)
BASOPHILS NFR BLD AUTO: 0.2 % — SIGNIFICANT CHANGE UP (ref 0–2)
BLD GP AB SCN SERPL QL: NEGATIVE — SIGNIFICANT CHANGE UP
EOSINOPHIL # BLD AUTO: 0.04 K/UL — SIGNIFICANT CHANGE UP (ref 0–0.5)
EOSINOPHIL NFR BLD AUTO: 0.6 % — SIGNIFICANT CHANGE UP (ref 0–6)
HCT VFR BLD CALC: 33 % — LOW (ref 34.5–45)
HCT VFR BLD CALC: 37.7 % — SIGNIFICANT CHANGE UP (ref 34.5–45)
HGB BLD-MCNC: 10.1 G/DL — LOW (ref 11.5–15.5)
HGB BLD-MCNC: 11.4 G/DL — LOW (ref 11.5–15.5)
IMM GRANULOCYTES NFR BLD AUTO: 0.5 % — SIGNIFICANT CHANGE UP (ref 0–1.5)
LYMPHOCYTES # BLD AUTO: 2.86 K/UL — SIGNIFICANT CHANGE UP (ref 1–3.3)
LYMPHOCYTES # BLD AUTO: 43.9 % — SIGNIFICANT CHANGE UP (ref 13–44)
MCHC RBC-ENTMCNC: 23.8 PG — LOW (ref 27–34)
MCHC RBC-ENTMCNC: 24.2 PG — LOW (ref 27–34)
MCHC RBC-ENTMCNC: 30.2 % — LOW (ref 32–36)
MCHC RBC-ENTMCNC: 30.6 % — LOW (ref 32–36)
MCV RBC AUTO: 78.5 FL — LOW (ref 80–100)
MCV RBC AUTO: 78.9 FL — LOW (ref 80–100)
MONOCYTES # BLD AUTO: 0.42 K/UL — SIGNIFICANT CHANGE UP (ref 0–0.9)
MONOCYTES NFR BLD AUTO: 6.4 % — SIGNIFICANT CHANGE UP (ref 2–14)
NEUTROPHILS # BLD AUTO: 3.16 K/UL — SIGNIFICANT CHANGE UP (ref 1.8–7.4)
NEUTROPHILS NFR BLD AUTO: 48.4 % — SIGNIFICANT CHANGE UP (ref 43–77)
NRBC # FLD: 0 K/UL — SIGNIFICANT CHANGE UP (ref 0–0)
NRBC # FLD: 0 K/UL — SIGNIFICANT CHANGE UP (ref 0–0)
PLATELET # BLD AUTO: 188 K/UL — SIGNIFICANT CHANGE UP (ref 150–400)
PLATELET # BLD AUTO: 225 K/UL — SIGNIFICANT CHANGE UP (ref 150–400)
PMV BLD: 11.2 FL — SIGNIFICANT CHANGE UP (ref 7–13)
PMV BLD: 11.4 FL — SIGNIFICANT CHANGE UP (ref 7–13)
RBC # BLD: 4.18 M/UL — SIGNIFICANT CHANGE UP (ref 3.8–5.2)
RBC # BLD: 4.8 M/UL — SIGNIFICANT CHANGE UP (ref 3.8–5.2)
RBC # FLD: 19 % — HIGH (ref 10.3–14.5)
RBC # FLD: 19 % — HIGH (ref 10.3–14.5)
RH IG SCN BLD-IMP: POSITIVE — SIGNIFICANT CHANGE UP
WBC # BLD: 6.52 K/UL — SIGNIFICANT CHANGE UP (ref 3.8–10.5)
WBC # BLD: 9.33 K/UL — SIGNIFICANT CHANGE UP (ref 3.8–10.5)
WBC # FLD AUTO: 6.52 K/UL — SIGNIFICANT CHANGE UP (ref 3.8–10.5)
WBC # FLD AUTO: 9.33 K/UL — SIGNIFICANT CHANGE UP (ref 3.8–10.5)

## 2019-06-17 PROCEDURE — 59514 CESAREAN DELIVERY ONLY: CPT | Mod: AS,U7

## 2019-06-17 RX ORDER — FAMOTIDINE 10 MG/ML
20 INJECTION INTRAVENOUS ONCE
Refills: 0 | Status: DISCONTINUED | OUTPATIENT
Start: 2019-06-17 | End: 2019-06-17

## 2019-06-17 RX ORDER — SIMETHICONE 80 MG/1
80 TABLET, CHEWABLE ORAL EVERY 4 HOURS
Refills: 0 | Status: DISCONTINUED | OUTPATIENT
Start: 2019-06-17 | End: 2019-06-21

## 2019-06-17 RX ORDER — DIPHENHYDRAMINE HCL 50 MG
25 CAPSULE ORAL EVERY 4 HOURS
Refills: 0 | Status: DISCONTINUED | OUTPATIENT
Start: 2019-06-17 | End: 2019-06-18

## 2019-06-17 RX ORDER — ACETAMINOPHEN 500 MG
975 TABLET ORAL EVERY 6 HOURS
Refills: 0 | Status: DISCONTINUED | OUTPATIENT
Start: 2019-06-17 | End: 2019-06-21

## 2019-06-17 RX ORDER — DIPHENHYDRAMINE HCL 50 MG
25 CAPSULE ORAL EVERY 6 HOURS
Refills: 0 | Status: DISCONTINUED | OUTPATIENT
Start: 2019-06-17 | End: 2019-06-21

## 2019-06-17 RX ORDER — MAGNESIUM HYDROXIDE 400 MG/1
30 TABLET, CHEWABLE ORAL
Refills: 0 | Status: DISCONTINUED | OUTPATIENT
Start: 2019-06-17 | End: 2019-06-21

## 2019-06-17 RX ORDER — SODIUM CHLORIDE 9 MG/ML
1000 INJECTION, SOLUTION INTRAVENOUS
Refills: 0 | Status: DISCONTINUED | OUTPATIENT
Start: 2019-06-17 | End: 2019-06-17

## 2019-06-17 RX ORDER — OXYCODONE HYDROCHLORIDE 5 MG/1
5 TABLET ORAL
Refills: 0 | Status: DISCONTINUED | OUTPATIENT
Start: 2019-06-17 | End: 2019-06-18

## 2019-06-17 RX ORDER — OXYCODONE HYDROCHLORIDE 5 MG/1
5 TABLET ORAL ONCE
Refills: 0 | Status: DISCONTINUED | OUTPATIENT
Start: 2019-06-17 | End: 2019-06-21

## 2019-06-17 RX ORDER — HYDROMORPHONE HYDROCHLORIDE 2 MG/ML
1 INJECTION INTRAMUSCULAR; INTRAVENOUS; SUBCUTANEOUS
Refills: 0 | Status: DISCONTINUED | OUTPATIENT
Start: 2019-06-17 | End: 2019-06-18

## 2019-06-17 RX ORDER — CITRIC ACID/SODIUM CITRATE 300-500 MG
30 SOLUTION, ORAL ORAL ONCE
Refills: 0 | Status: COMPLETED | OUTPATIENT
Start: 2019-06-17 | End: 2019-06-17

## 2019-06-17 RX ORDER — SODIUM CHLORIDE 9 MG/ML
1000 INJECTION, SOLUTION INTRAVENOUS ONCE
Refills: 0 | Status: COMPLETED | OUTPATIENT
Start: 2019-06-17 | End: 2019-06-17

## 2019-06-17 RX ORDER — OXYTOCIN 10 UNIT/ML
333.33 VIAL (ML) INJECTION
Qty: 20 | Refills: 0 | Status: DISCONTINUED | OUTPATIENT
Start: 2019-06-17 | End: 2019-06-17

## 2019-06-17 RX ORDER — OXYCODONE HYDROCHLORIDE 5 MG/1
5 TABLET ORAL
Refills: 0 | Status: COMPLETED | OUTPATIENT
Start: 2019-06-17 | End: 2019-06-24

## 2019-06-17 RX ORDER — METOCLOPRAMIDE HCL 10 MG
10 TABLET ORAL ONCE
Refills: 0 | Status: COMPLETED | OUTPATIENT
Start: 2019-06-17 | End: 2019-06-17

## 2019-06-17 RX ORDER — GLYCERIN ADULT
1 SUPPOSITORY, RECTAL RECTAL AT BEDTIME
Refills: 0 | Status: DISCONTINUED | OUTPATIENT
Start: 2019-06-17 | End: 2019-06-21

## 2019-06-17 RX ORDER — DOCUSATE SODIUM 100 MG
100 CAPSULE ORAL
Refills: 0 | Status: DISCONTINUED | OUTPATIENT
Start: 2019-06-17 | End: 2019-06-21

## 2019-06-17 RX ORDER — OXYCODONE HYDROCHLORIDE 5 MG/1
10 TABLET ORAL
Refills: 0 | Status: DISCONTINUED | OUTPATIENT
Start: 2019-06-17 | End: 2019-06-18

## 2019-06-17 RX ORDER — NALOXONE HYDROCHLORIDE 4 MG/.1ML
0.1 SPRAY NASAL
Refills: 0 | Status: DISCONTINUED | OUTPATIENT
Start: 2019-06-17 | End: 2019-06-18

## 2019-06-17 RX ORDER — KETOROLAC TROMETHAMINE 30 MG/ML
30 SYRINGE (ML) INJECTION EVERY 6 HOURS
Refills: 0 | Status: DISCONTINUED | OUTPATIENT
Start: 2019-06-17 | End: 2019-06-18

## 2019-06-17 RX ORDER — IBUPROFEN 200 MG
600 TABLET ORAL EVERY 6 HOURS
Refills: 0 | Status: COMPLETED | OUTPATIENT
Start: 2019-06-17 | End: 2020-05-15

## 2019-06-17 RX ORDER — TETANUS TOXOID, REDUCED DIPHTHERIA TOXOID AND ACELLULAR PERTUSSIS VACCINE, ADSORBED 5; 2.5; 8; 8; 2.5 [IU]/.5ML; [IU]/.5ML; UG/.5ML; UG/.5ML; UG/.5ML
0.5 SUSPENSION INTRAMUSCULAR ONCE
Refills: 0 | Status: COMPLETED | OUTPATIENT
Start: 2019-06-17

## 2019-06-17 RX ORDER — LANOLIN
1 OINTMENT (GRAM) TOPICAL EVERY 6 HOURS
Refills: 0 | Status: DISCONTINUED | OUTPATIENT
Start: 2019-06-17 | End: 2019-06-21

## 2019-06-17 RX ORDER — HEPARIN SODIUM 5000 [USP'U]/ML
5000 INJECTION INTRAVENOUS; SUBCUTANEOUS EVERY 12 HOURS
Refills: 0 | Status: DISCONTINUED | OUTPATIENT
Start: 2019-06-17 | End: 2019-06-21

## 2019-06-17 RX ORDER — ONDANSETRON 8 MG/1
4 TABLET, FILM COATED ORAL EVERY 6 HOURS
Refills: 0 | Status: DISCONTINUED | OUTPATIENT
Start: 2019-06-17 | End: 2019-06-18

## 2019-06-17 RX ADMIN — SODIUM CHLORIDE 125 MILLILITER(S): 9 INJECTION, SOLUTION INTRAVENOUS at 06:41

## 2019-06-17 RX ADMIN — Medication 1000 MILLIUNIT(S)/MIN: at 11:38

## 2019-06-17 RX ADMIN — Medication 30 MILLILITER(S): at 06:41

## 2019-06-17 RX ADMIN — Medication 30 MILLIGRAM(S): at 16:40

## 2019-06-17 RX ADMIN — Medication 30 MILLIGRAM(S): at 23:45

## 2019-06-17 RX ADMIN — HEPARIN SODIUM 5000 UNIT(S): 5000 INJECTION INTRAVENOUS; SUBCUTANEOUS at 16:40

## 2019-06-17 RX ADMIN — Medication 30 MILLIGRAM(S): at 22:42

## 2019-06-17 RX ADMIN — Medication 10 MILLIGRAM(S): at 06:41

## 2019-06-17 RX ADMIN — SODIUM CHLORIDE 75 MILLILITER(S): 9 INJECTION, SOLUTION INTRAVENOUS at 11:30

## 2019-06-17 RX ADMIN — SODIUM CHLORIDE 2000 MILLILITER(S): 9 INJECTION, SOLUTION INTRAVENOUS at 06:41

## 2019-06-17 NOTE — OB PROVIDER H&P - PROBLEM SELECTOR PLAN 1
pt scheduled for repeat  on 19  Preop instructions provided. Pt verbalized understanding.    written and verbal instructions with teach back on chlorhexidine shampoo provided,  pt verbalized understanding with returned demonstration  follow OB instructions regarding aspirin use scheduled c/s

## 2019-06-17 NOTE — OB PROVIDER H&P - HISTORY OF PRESENT ILLNESS
27yo female  EDC 19  presents@37.6wks for scheduled rltc.  +AP course PEC & GDMA1, otherwise unremarkable.   Denies VB,ROM or UCs today.  +FM

## 2019-06-17 NOTE — OB PROVIDER H&P - NSHPREVIEWOFSYSTEMS_GEN_ALL_CORE
General: No fever, chills, sweating, anorexia, weight loss or weight gain. No polyphagia, polyurea, polydypsia, malaise, or fatigue    Skin: No rashes, itching, or dryness. No change in size/color of moles. No tumors, brittle nails, pitted nails, or hair loss    Breast: No tenderness, lumps, or nipple discharge      Ophthalmologic: No diplopia, photophobia, lacrimation, blurred Vision , or eye discharge    ENMT Symptoms: No hearing difficulty, ear pain, tinnitus, or vertigo. No sinus symptoms, nasal congestion, nasal   discharge, or nasal obstruction    Respiratory and Thorax: No wheezing, dyspnea, cough, hemoptysis, or pleuritic chest pain     Cardiovascular: No chest pain, palpitations, dyspnea on exertion, orthopnea, paroxysmal nocturnal dyspnea,   peripheral edema, or claudication    Gastrointestinal: No nausea, vomiting, diarrhea, constipation, change in bowel habits, flatulence, abdominal pain, or melena    Genitourinary/ Pelvis: No hematuria, renal colic, or flank pain.  No urine discoloration, incontinence, dysuria, or urinary hesitancy. Normal urinary frequency. No nocturia, abnormal vaginal bleeding, vaginal discharge, spotting, pelvic pain, or vaginal leakage    Musculoskeletal: No arthralgia, arthritis, joint swelling, muscle cramping, muscle weakness, neck pain, arm pain, or leg pain    Neurological: No transient paralysis, weakness, paresthesias, or seizures. No syncope, tremors, vertigo, loss of sensation, difficulty walking, loss of consciousness, hemiparesis, confusion, or facial palsy    Psychiatric: No suicidal ideation, depression, anxiety, insomnia, memory loss, paranoia, mood swings, agitation, hallucinations, or hyperactivity    Hematology: No gum bleeding, nose bleeding, or skin lumps    Lymphatic: No enlarged or tender lymph nodes. No extremity swelling    Endocrine: hx of gestational diabetes during pregnancy in 2017, No heat or cold intolerance    Immunologic: No recurrent or persistent infections denies

## 2019-06-17 NOTE — OB PROVIDER H&P - ALERT: PERTINENT HISTORY
20 Week Level II Sonogram/1st Trimester Sonogram/Follow up Sonogram for Growth/Ultra Screen at 12 Weeks

## 2019-06-17 NOTE — OB NEONATOLOGY/PEDIATRICIAN DELIVERY SUMMARY - NSPEDSNEONOTESA_OBGYN_ALL_OB_FT
Called for repeat C/S at 37.6 wk GA due to maternal pre-eclampsia with proteuria. Mother is a 27 yo  O+, PNL neg/immune. GBS positive but no labor and AROM in DR. Pregnancy complicated by maternal GDM diet controlled. Baby born vigorous, spontaneous cry, d/s/s. AS , routine resuscitation. Mother desires to breastfeed, concerted to birth dose of hep B and PMD is Jose Guadalupe ([facility]).

## 2019-06-17 NOTE — DISCHARGE NOTE OB - PATIENT PORTAL LINK FT
You can access the [facility], offered by [facility], by registering with the following website: [email or web address]

## 2019-06-17 NOTE — OB PROVIDER DELIVERY SUMMARY - NSPROVIDERDELIVERYNOTE_OBGYN_ALL_OB_FT
Patient taken to OR for scheduled repeat  section. Dense adhesions noted between fascia/rectus muscle and anterior uterus/bladder to anterior abdominal wall. Lysis of adhesions performed. Bladder backfilled with methylene blue to better identify the borders the bladder. 2nd attending called in for assistance. Adhesions taken down with good visualization of bladder. Due to dense adhesions between bladder and anterior uterus, high transverse/J incision made.  delivered atraumatically. Cord clamped and cut. Cord gas obtained. Placenta delivered. Hysterotomy closed in two layers. Continued bleeding noted from incision and from edges of lysed adhesions. Additional figure of eight sutures placed for hemostasis. Fibrillar placed over uterine incision. Fascia closed with vicryl. Subcutaneous layer closed with 2-0 plain. Skin closed with 3-0 vicryl.  EBL 1300cc. Patient taken to OR for scheduled repeat  section. Dense adhesions noted between fascia/rectus muscle and anterior uterus/bladder to anterior abdominal wall. Lysis of adhesions performed. Bladder backfilled with methylene blue to better identify the borders the bladder. 2nd attending called in for assistance. Adhesions taken down with good visualization of bladder. Due to dense adhesions between bladder and anterior uterus, high transverse/J incision made.  delivered atraumatically. Cord clamped and cut. Cord gas obtained. Placenta delivered. Hysterotomy closed in two layers. Continued bleeding noted from incision and from edges of lysed adhesions. Additional figure of eight sutures placed for hemostasis. Fibrillar placed over uterine incision. Fascia closed with vicryl. Subcutaneous layer closed with 2-0 plain. Skin closed with 3-0 vicryl.  EBL 1300cc. Patient can NOT TOLAC in next pregnancy.

## 2019-06-17 NOTE — DISCHARGE NOTE OB - CARE PROVIDER_API CALL
Annamaria Lopez)  Gynecology Obstetrics  Gynecology  [address] [place] [address]  Phone: [phone]  Fax: [phone]  Follow Up Time:

## 2019-06-17 NOTE — BRIEF OPERATIVE NOTE - NSICDXBRIEFPROCEDURE_GEN_ALL_CORE_FT
PROCEDURES:  Lysis, adhesions, peritoneum 2019 11:18:12  Hillary Odell  Repeat  section 2019 11:17:41  Hillary Odell

## 2019-06-17 NOTE — BRIEF OPERATIVE NOTE - OPERATION/FINDINGS
viable female infant, apgar 8/9, wgt 6.14#  cephalic presentation, clear fluid,extensive adhesion fascia to muscle, to peritoneum  thick adhesion lower uterine segment  methylene blue, 300cc backfilled , bladder edges in tact  hyterotomy, J- incision, closed in two layers  otherwise grossly nl uterus, tubes & ovaries  EBL: 1300  IVF: 3500  UOP: 800

## 2019-06-17 NOTE — OB PROVIDER H&P - NSHPPHYSICALEXAM_GEN_ALL_CORE
Constitutional: Well Developed, Well Groomed, Well Nourished, No Distress    Eyes: PERRL, EOMI, conjunctiva clear    Ears: Normal    Mouth & Gums: Normal, moist    Pharynx: No tenderness, discharge, or peritonsillar abscess    Tonsils: No Redness, discharge, tenderness, or swelling    Neck: Supple, no JVD, normal thyroid glands,     Breast: Normal shape,     Back: Normal shape, ROM intact, strength intact, no vertebral tenderness    Respiratory: Airway patent, breath sounds equal, good air movement, respiration non-labored, clear to auscultation bilateral    Cardiovascular:  Regular rate and rhythm, no rubs or murmur, normal PMI    Gastrointestinal: gravid abdomen    Extremities: No clubbing, cyanosis, or pedal edema    Vascular:  Radial Pulse normal,  DP pulse normal    Neurological: alert & oriented x 3, sensation intact, normal strength    Skin: warm and dry, normal color    Lymph Nodes: normal posterior cervical lymph node, normal anterior cervical lymph node, normal supraclavicular lymph node,     Musculoskeletal: ROM intact, no joint swelling, warmth, or calf tenderness. Normal strength    Psychiatric: normal affect, normal behavior T(C): 36.8 (06-17-19 @ 06:20), Max: 36.8 (06-17-19 @ 06:20)  HR: 61 (06-17-19 @ 06:25) (57 - 87)  BP: 125/77 (06-17-19 @ 06:25) (125/77 - 133/92)  RR: 12 (06-17-19 @ 06:20) (12 - 12)    Heart: RRR, S1&S2, no S3  Lungs: Clear bilateral to auscultation, good inspiratory /expiratory effort              no rhonchi, no rales  Abd: Gravid  EFM: 140 bpm/moderate variabilty/+accelerations/no decelerations/CAT 1  TOCO:  no UC  Ext: FROM, minimal edema

## 2019-06-17 NOTE — DISCHARGE NOTE OB - MEDICATION SUMMARY - MEDICATIONS TO TAKE
I will START or STAY ON the medications listed below when I get home from the hospital:    acetaminophen 325 mg oral tablet  -- 3 tab(s) by mouth every 6 hours  -- Indication: For Pain     ibuprofen 600 mg oral tablet  -- 1 tab(s) by mouth every 6 hours  -- Indication: For Pain     oxyCODONE 5 mg oral tablet  -- 1 tab(s) by mouth once, As needed, Severe Pain (7 - 10)  -- Indication: For Pain     oxyCODONE 5 mg oral tablet  -- 1 tab(s) by mouth every 3 hours, As needed, Moderate Pain (4 - 6)  -- Indication: For Pain

## 2019-06-17 NOTE — BRIEF OPERATIVE NOTE - NSICDXBRIEFPREOP_GEN_ALL_CORE_FT
PRE-OP DIAGNOSIS:  Previous  section 2019 11:21:15  Hillary Odell  Gestational diabetes mellitus 2019 11:20:40  Hillary Odell  Preeclampsia 2019 11:18:45  Hillary Odell

## 2019-06-17 NOTE — DISCHARGE NOTE OB - MATERIALS PROVIDED
[facility] Hearing Screen Program/Shaken Baby Prevention Handout/Vaccinations/[facility]  Screening Program/Birth Certificate Instructions/Guide to Postpartum Care

## 2019-06-17 NOTE — OB PROVIDER H&P - ASSESSMENT
28 y.o. female with preop diagnosis of encounter for  delivery without indication Admit to L&D  sylvie rLTCS/pEC/GDMA1  NPO  routine labs  EFM/TOCO  anesthesia consult  Hillary Norman PAC  06-17-19 @ 07:08

## 2019-06-17 NOTE — DISCHARGE NOTE OB - CARE PLAN
Principal Discharge DX:	 delivery delivered  Goal:	postoperative recovery  Assessment and plan of treatment:	routine postop care

## 2019-06-18 LAB
BASOPHILS # BLD AUTO: 0.01 K/UL — SIGNIFICANT CHANGE UP (ref 0–0.2)
BASOPHILS NFR BLD AUTO: 0.1 % — SIGNIFICANT CHANGE UP (ref 0–2)
EOSINOPHIL # BLD AUTO: 0.02 K/UL — SIGNIFICANT CHANGE UP (ref 0–0.5)
EOSINOPHIL NFR BLD AUTO: 0.2 % — SIGNIFICANT CHANGE UP (ref 0–6)
HCT VFR BLD CALC: 31 % — LOW (ref 34.5–45)
HCT VFR BLD CALC: 31 % — LOW (ref 34.5–45)
HGB BLD-MCNC: 9.6 G/DL — LOW (ref 11.5–15.5)
HGB BLD-MCNC: 9.6 G/DL — LOW (ref 11.5–15.5)
IMM GRANULOCYTES NFR BLD AUTO: 0.3 % — SIGNIFICANT CHANGE UP (ref 0–1.5)
LYMPHOCYTES # BLD AUTO: 1.73 K/UL — SIGNIFICANT CHANGE UP (ref 1–3.3)
LYMPHOCYTES # BLD AUTO: 18.4 % — SIGNIFICANT CHANGE UP (ref 13–44)
MCHC RBC-ENTMCNC: 24.3 PG — LOW (ref 27–34)
MCHC RBC-ENTMCNC: 24.3 PG — LOW (ref 27–34)
MCHC RBC-ENTMCNC: 31 % — LOW (ref 32–36)
MCHC RBC-ENTMCNC: 31 % — LOW (ref 32–36)
MCV RBC AUTO: 78.5 FL — LOW (ref 80–100)
MCV RBC AUTO: 78.5 FL — LOW (ref 80–100)
MONOCYTES # BLD AUTO: 0.55 K/UL — SIGNIFICANT CHANGE UP (ref 0–0.9)
MONOCYTES NFR BLD AUTO: 5.8 % — SIGNIFICANT CHANGE UP (ref 2–14)
NEUTROPHILS # BLD AUTO: 7.08 K/UL — SIGNIFICANT CHANGE UP (ref 1.8–7.4)
NEUTROPHILS NFR BLD AUTO: 75.2 % — SIGNIFICANT CHANGE UP (ref 43–77)
NRBC # FLD: 0 K/UL — SIGNIFICANT CHANGE UP (ref 0–0)
NRBC # FLD: 0 K/UL — SIGNIFICANT CHANGE UP (ref 0–0)
PLATELET # BLD AUTO: 196 K/UL — SIGNIFICANT CHANGE UP (ref 150–400)
PLATELET # BLD AUTO: 196 K/UL — SIGNIFICANT CHANGE UP (ref 150–400)
PMV BLD: 11.1 FL — SIGNIFICANT CHANGE UP (ref 7–13)
PMV BLD: 11.1 FL — SIGNIFICANT CHANGE UP (ref 7–13)
RBC # BLD: 3.95 M/UL — SIGNIFICANT CHANGE UP (ref 3.8–5.2)
RBC # BLD: 3.95 M/UL — SIGNIFICANT CHANGE UP (ref 3.8–5.2)
RBC # FLD: 19.4 % — HIGH (ref 10.3–14.5)
RBC # FLD: 19.4 % — HIGH (ref 10.3–14.5)
WBC # BLD: 9.42 K/UL — SIGNIFICANT CHANGE UP (ref 3.8–10.5)
WBC # BLD: 9.42 K/UL — SIGNIFICANT CHANGE UP (ref 3.8–10.5)
WBC # FLD AUTO: 9.42 K/UL — SIGNIFICANT CHANGE UP (ref 3.8–10.5)
WBC # FLD AUTO: 9.42 K/UL — SIGNIFICANT CHANGE UP (ref 3.8–10.5)

## 2019-06-18 RX ORDER — OXYCODONE HYDROCHLORIDE 5 MG/1
5 TABLET ORAL
Refills: 0 | Status: DISCONTINUED | OUTPATIENT
Start: 2019-06-18 | End: 2019-06-21

## 2019-06-18 RX ORDER — IBUPROFEN 200 MG
600 TABLET ORAL EVERY 6 HOURS
Refills: 0 | Status: DISCONTINUED | OUTPATIENT
Start: 2019-06-18 | End: 2019-06-21

## 2019-06-18 RX ADMIN — OXYCODONE HYDROCHLORIDE 5 MILLIGRAM(S): 5 TABLET ORAL at 01:10

## 2019-06-18 RX ADMIN — Medication 975 MILLIGRAM(S): at 16:54

## 2019-06-18 RX ADMIN — Medication 975 MILLIGRAM(S): at 22:57

## 2019-06-18 RX ADMIN — Medication 600 MILLIGRAM(S): at 13:30

## 2019-06-18 RX ADMIN — SIMETHICONE 80 MILLIGRAM(S): 80 TABLET, CHEWABLE ORAL at 05:38

## 2019-06-18 RX ADMIN — Medication 975 MILLIGRAM(S): at 22:01

## 2019-06-18 RX ADMIN — Medication 100 MILLIGRAM(S): at 00:19

## 2019-06-18 RX ADMIN — MAGNESIUM HYDROXIDE 30 MILLILITER(S): 400 TABLET, CHEWABLE ORAL at 14:21

## 2019-06-18 RX ADMIN — Medication 600 MILLIGRAM(S): at 12:38

## 2019-06-18 RX ADMIN — Medication 600 MILLIGRAM(S): at 18:19

## 2019-06-18 RX ADMIN — Medication 30 MILLIGRAM(S): at 04:37

## 2019-06-18 RX ADMIN — SIMETHICONE 80 MILLIGRAM(S): 80 TABLET, CHEWABLE ORAL at 00:19

## 2019-06-18 RX ADMIN — HEPARIN SODIUM 5000 UNIT(S): 5000 INJECTION INTRAVENOUS; SUBCUTANEOUS at 05:38

## 2019-06-18 RX ADMIN — OXYCODONE HYDROCHLORIDE 5 MILLIGRAM(S): 5 TABLET ORAL at 00:17

## 2019-06-18 RX ADMIN — Medication 30 MILLIGRAM(S): at 05:30

## 2019-06-18 RX ADMIN — Medication 975 MILLIGRAM(S): at 15:30

## 2019-06-18 RX ADMIN — Medication 100 MILLIGRAM(S): at 14:21

## 2019-06-18 RX ADMIN — HEPARIN SODIUM 5000 UNIT(S): 5000 INJECTION INTRAVENOUS; SUBCUTANEOUS at 18:19

## 2019-06-18 NOTE — PROGRESS NOTE ADULT - SUBJECTIVE AND OBJECTIVE BOX
OB Progress Note:  Delivery, POD#1    S: 27yo POD#1 s/p LTCS c/b siPEC by 24hr protein at office of 350. Her pain is well controlled. She is tolerating a regular diet has not yet passed flatus. Denies N/V. Denies CP/SOB/lightheadedness/dizziness.   She is ambulating without difficulty.   Voiding spontaneously  Denies heavy vaginal bleeding.    O:   Vital Signs Last 24 Hrs  T(C): 37.2 (2019 05:29), Max: 37.4 (2019 22:50)  T(F): 99 (2019 05:29), Max: 99.4 (2019 22:50)  HR: 93 (2019 05:29) (63 - 93)  BP: 103/55 (2019 05:29) (103/55 - 132/85)  BP(mean): 89 (2019 13:00) (82 - 96)  RR: 18 (2019 05:29) (15 - 22)  SpO2: 97% (2019 05:29) (97% - 100%)    Labs:  Blood type: O Positive  Rubella IgG: RPR: Negative                          9.6<L>   9.42 >-----------< 196    (  @ 05:45 )             31.0<L>                        10.1<L>   9.33 >-----------< 188    (  @ 14:30 )             33.0<L>                        11.4<L>   6.52 >-----------< 225    (  @ 06:10 )             37.7                  PE:  General: NAD  Abdomen: Mildly distended, appropriately tender, incision c/d/i.  Extremities: No erythema, no pitting edema

## 2019-06-18 NOTE — PROGRESS NOTE ADULT - SUBJECTIVE AND OBJECTIVE BOX
Post-Operative Note, C/S  She is a  28y woman who is now post-operative day: 1    Subjective:  The patient feels well.  She is ambulating.   She is tolerating regular diet.  She denies nausea and vomiting; denies fever.  She is voiding.  Her pain is controlled; incisional pain is appropriate.  She reports normal postpartum bleeding.  She is breastfeeding.  She is formula feeding.    Physical exam:    Vital Signs Last 24 Hrs  T(C): 37.2 (18 Jun 2019 05:29), Max: 37.4 (17 Jun 2019 22:50)  T(F): 99 (18 Jun 2019 05:29), Max: 99.4 (17 Jun 2019 22:50)  HR: 93 (18 Jun 2019 05:29) (63 - 93)  BP: 103/55 (18 Jun 2019 05:29) (103/55 - 132/85)  BP(mean): 89 (17 Jun 2019 13:00) (82 - 96)  RR: 18 (18 Jun 2019 05:29) (15 - 22)  SpO2: 97% (18 Jun 2019 05:29) (97% - 100%)    Gen: NAD  Breast: Soft, nontender, not engorged.  Abdomen: Soft, nontender, no distension , firm uterine fundus at umbilicus.  Incision: dressing removed, C/D/I.  Pelvic: Normal lochia noted  Ext: No calf tenderness    LABS:                        9.6    9.42  )-----------( 196      ( 18 Jun 2019 05:45 )             31.0       Rubella status:     Allergies    No Known Allergies    Intolerances      MEDICATIONS  (STANDING):  acetaminophen   Tablet .. 975 milliGRAM(s) Oral every 6 hours  diphtheria/tetanus/pertussis (acellular) Vaccine (ADAcel) 0.5 milliLiter(s) IntraMuscular once  heparin  Injectable 5000 Unit(s) SubCutaneous every 12 hours  ibuprofen  Tablet. 600 milliGRAM(s) Oral every 6 hours  ketorolac   Injectable 30 milliGRAM(s) IV Push every 6 hours    MEDICATIONS  (PRN):  diphenhydrAMINE 25 milliGRAM(s) Oral every 6 hours PRN Itching  diphenhydrAMINE   Injectable 25 milliGRAM(s) IV Push every 4 hours PRN Pruritus  docusate sodium 100 milliGRAM(s) Oral two times a day PRN Stool softening  glycerin Suppository - Adult 1 Suppository(s) Rectal at bedtime PRN Constipation  HYDROmorphone  Injectable 1 milliGRAM(s) IV Push every 3 hours PRN Severe Pain (7 - 10)  lanolin Ointment 1 Application(s) Topical every 6 hours PRN Sore Nipples  magnesium hydroxide Suspension 30 milliLiter(s) Oral two times a day PRN Constipation  naloxone Injectable 0.1 milliGRAM(s) IV Push every 3 minutes PRN For ANY of the following changes in patient status:  A. RR LESS THAN 10 breaths per minute, B. Oxygen saturation LESS THAN 90%, C. Sedation score of 6  ondansetron Injectable 4 milliGRAM(s) IV Push every 6 hours PRN Nausea  oxyCODONE    IR 5 milliGRAM(s) Oral every 3 hours PRN Mild Pain (1 - 3)  oxyCODONE    IR 10 milliGRAM(s) Oral every 3 hours PRN Moderate Pain (4 - 6)  oxyCODONE    IR 5 milliGRAM(s) Oral every 3 hours PRN Moderate Pain (4 - 6)  oxyCODONE    IR 5 milliGRAM(s) Oral once PRN Severe Pain (7 - 10)  simethicone 80 milliGRAM(s) Chew every 4 hours PRN Gas        Assessment and Plan  POD #1 s/p C/S.  Doing well.  Encourage ambulation.  Incisional care and PO instructions reviewed.  CPC.

## 2019-06-18 NOTE — PROGRESS NOTE ADULT - PROBLEM SELECTOR PLAN 1
- Continue regular diet.  - Increase ambulation.  - Continue motrin, tylenol, oxycodone PRN for pain control.  .  - F/u AM CBC    Pelon Thomas PGY-1

## 2019-06-18 NOTE — PROGRESS NOTE ADULT - ASSESSMENT
A/P: 29yo POD#1 s/p LTCS c/b siPEC by 24hr protein at office of 350..  Patient is stable and doing well post-operatively.

## 2019-06-19 RX ORDER — BENZOYL PEROXIDE MICRONIZED 5.8 %
1 TOWELETTE (EA) TOPICAL
Qty: 0 | Refills: 0 | DISCHARGE

## 2019-06-19 RX ORDER — OXYCODONE HYDROCHLORIDE 5 MG/1
1 TABLET ORAL
Qty: 0 | Refills: 0 | DISCHARGE
Start: 2019-06-19

## 2019-06-19 RX ORDER — ASPIRIN/CALCIUM CARB/MAGNESIUM 324 MG
1 TABLET ORAL
Qty: 0 | Refills: 0 | DISCHARGE

## 2019-06-19 RX ORDER — IBUPROFEN 200 MG
1 TABLET ORAL
Qty: 0 | Refills: 0 | DISCHARGE
Start: 2019-06-19

## 2019-06-19 RX ORDER — ACETAMINOPHEN 500 MG
3 TABLET ORAL
Qty: 0 | Refills: 0 | DISCHARGE
Start: 2019-06-19

## 2019-06-19 RX ADMIN — Medication 600 MILLIGRAM(S): at 06:15

## 2019-06-19 RX ADMIN — OXYCODONE HYDROCHLORIDE 5 MILLIGRAM(S): 5 TABLET ORAL at 01:00

## 2019-06-19 RX ADMIN — SIMETHICONE 80 MILLIGRAM(S): 80 TABLET, CHEWABLE ORAL at 00:35

## 2019-06-19 RX ADMIN — MAGNESIUM HYDROXIDE 30 MILLILITER(S): 400 TABLET, CHEWABLE ORAL at 18:00

## 2019-06-19 RX ADMIN — OXYCODONE HYDROCHLORIDE 5 MILLIGRAM(S): 5 TABLET ORAL at 13:02

## 2019-06-19 RX ADMIN — Medication 600 MILLIGRAM(S): at 00:03

## 2019-06-19 RX ADMIN — Medication 600 MILLIGRAM(S): at 18:55

## 2019-06-19 RX ADMIN — Medication 100 MILLIGRAM(S): at 16:06

## 2019-06-19 RX ADMIN — SIMETHICONE 80 MILLIGRAM(S): 80 TABLET, CHEWABLE ORAL at 04:32

## 2019-06-19 RX ADMIN — OXYCODONE HYDROCHLORIDE 5 MILLIGRAM(S): 5 TABLET ORAL at 00:05

## 2019-06-19 RX ADMIN — OXYCODONE HYDROCHLORIDE 5 MILLIGRAM(S): 5 TABLET ORAL at 12:15

## 2019-06-19 RX ADMIN — Medication 600 MILLIGRAM(S): at 13:02

## 2019-06-19 RX ADMIN — Medication 600 MILLIGRAM(S): at 01:00

## 2019-06-19 RX ADMIN — Medication 600 MILLIGRAM(S): at 18:02

## 2019-06-19 RX ADMIN — Medication 100 MILLIGRAM(S): at 04:06

## 2019-06-19 RX ADMIN — Medication 975 MILLIGRAM(S): at 16:50

## 2019-06-19 RX ADMIN — Medication 600 MILLIGRAM(S): at 12:15

## 2019-06-19 RX ADMIN — OXYCODONE HYDROCHLORIDE 5 MILLIGRAM(S): 5 TABLET ORAL at 18:02

## 2019-06-19 RX ADMIN — HEPARIN SODIUM 5000 UNIT(S): 5000 INJECTION INTRAVENOUS; SUBCUTANEOUS at 06:15

## 2019-06-19 RX ADMIN — Medication 975 MILLIGRAM(S): at 22:50

## 2019-06-19 RX ADMIN — SIMETHICONE 80 MILLIGRAM(S): 80 TABLET, CHEWABLE ORAL at 21:51

## 2019-06-19 RX ADMIN — OXYCODONE HYDROCHLORIDE 5 MILLIGRAM(S): 5 TABLET ORAL at 18:56

## 2019-06-19 RX ADMIN — Medication 600 MILLIGRAM(S): at 06:55

## 2019-06-19 RX ADMIN — Medication 975 MILLIGRAM(S): at 10:35

## 2019-06-19 RX ADMIN — Medication 975 MILLIGRAM(S): at 05:00

## 2019-06-19 RX ADMIN — Medication 975 MILLIGRAM(S): at 21:49

## 2019-06-19 RX ADMIN — Medication 975 MILLIGRAM(S): at 16:06

## 2019-06-19 RX ADMIN — SIMETHICONE 80 MILLIGRAM(S): 80 TABLET, CHEWABLE ORAL at 16:07

## 2019-06-19 RX ADMIN — HEPARIN SODIUM 5000 UNIT(S): 5000 INJECTION INTRAVENOUS; SUBCUTANEOUS at 18:02

## 2019-06-19 RX ADMIN — SIMETHICONE 80 MILLIGRAM(S): 80 TABLET, CHEWABLE ORAL at 10:06

## 2019-06-19 RX ADMIN — Medication 975 MILLIGRAM(S): at 10:05

## 2019-06-19 RX ADMIN — Medication 975 MILLIGRAM(S): at 04:05

## 2019-06-19 NOTE — PROGRESS NOTE ADULT - SUBJECTIVE AND OBJECTIVE BOX
SUBJECTIVE:    Pain: well controlled  Complaints: none    MILESTONES:    Alert and oriented x 3  [x  ]  Out of bed/ ambulating. [x  ]  Flatus: [x  ]  Postive [  ] Negative   Bowel movement  [  ] Positive [ x ] Negative   Voiding [x  ] Due to void [  ]   Diet: Regular [ x ]  Clears [  ]  NPO [  ]  Infant feeding:  Breast [x  ]   Bottle [  ]  Both [  ]  Feeding related inssues and/or concerns: none      OBJECTIVE:  T(C): 36.8 (19 @ 06:07), Max: 37.3 (19 @ 14:38)  HR: 74 (19 @ 06:07) (74 - 101)  BP: 128/67 (19 @ 06:07) (121/65 - 128/67)  RR: 18 (19 @ 06:07) (17 - 18)  SpO2: 100% (19 @ 06:07) (99% - 100%)  Wt(kg): --                        9.6    9.42  )-----------( 196      ( 2019 05:45 )             31.0     MEDICATIONS  (STANDING):  acetaminophen   Tablet .. 975 milliGRAM(s) Oral every 6 hours  diphtheria/tetanus/pertussis (acellular) Vaccine (ADAcel) 0.5 milliLiter(s) IntraMuscular once  heparin  Injectable 5000 Unit(s) SubCutaneous every 12 hours  ibuprofen  Tablet. 600 milliGRAM(s) Oral every 6 hours    MEDICATIONS  (PRN):  diphenhydrAMINE 25 milliGRAM(s) Oral every 6 hours PRN Itching  docusate sodium 100 milliGRAM(s) Oral two times a day PRN Stool softening  glycerin Suppository - Adult 1 Suppository(s) Rectal at bedtime PRN Constipation  lanolin Ointment 1 Application(s) Topical every 6 hours PRN Sore Nipples  magnesium hydroxide Suspension 30 milliLiter(s) Oral two times a day PRN Constipation  oxyCODONE    IR 5 milliGRAM(s) Oral once PRN Severe Pain (7 - 10)  oxyCODONE    IR 5 milliGRAM(s) Oral every 3 hours PRN Moderate Pain (4 - 6)  simethicone 80 milliGRAM(s) Chew every 4 hours PRN Gas    ASSESSMENT:  28y  y/o G  2 P 2   PO Day#  2      Delivery: Primary [  ]    Repeat [ x ]                                       Indication of procedure: previous c/s  Condition: Stable  Past Medical History significant for: HPI:  27yo female  EDC 19  presents@37.6wks for scheduled rltc.  +AP course PEC & GDMA1, otherwise unremarkable.   Denies VB,ROM or UCs today.  +FM (2019 06:57)    Current Issues:none  Heart:   RRR                         Lungs:clear  Breasts:  Soft [  x]   Engorged [  ]  Abdomen: Soft [ x ] , distended [  ] nontender [  ]   Bowel sounds :  Present [ x ]  Absent [  ]   Fundus firm [x  ]  Boggy [  ]  Abdominal incision: Clean, dry and intact [x  ]  Staples [  ] Steri Strips [x  ] Dermabond [  ] Sutures [  ]  Patient wearing abdominal binder for support.  Vaginal: Lochia:  Heavy [  ]  Moderate [  ]   Scant [x ]  Extremities: Edema [ 0 ] negative Neftali's Sign [ x ] Nontender Marino  [ x ] Positive pedal pulses [x  ]  Other relevant physical exam findings: none      PLAN:  Plan: Increase ambulation, analgesia PRN and pain medication protocol standing oxycodone, ibuprofen and acetaminophen.  Diet: Regular diet  Continue routine post-operative and postpartum care.

## 2019-06-19 NOTE — PROGRESS NOTE ADULT - SUBJECTIVE AND OBJECTIVE BOX
Pain Service Follow-up  Postop Day  1    S/P  C- Section    T(C): 36.8 (06-19-19 @ 06:07), Max: 37.3 (06-18-19 @ 14:38)  HR: 74 (06-19-19 @ 06:07) (74 - 101)  BP: 128/67 (06-19-19 @ 06:07) (121/65 - 128/67)  RR: 18 (06-19-19 @ 06:07) (17 - 18)  SpO2: 100% (06-19-19 @ 06:07) (99% - 100%)  Wt(kg): --      THERAPY:  Spinal Morphine     Sedation Score:	  [X] Alert	      [  ] Drowsy       [  ] Arousable	[  ] Asleep         [  ] Unresponsive    Side Effects:	  [X] None	      [  ] Nausea       [  ] Pruritus        [  ] Weakness   [  ] Numbness        ASSESSMENT/ PLAN   [ X ] Discontinue         [  ] Continue    [ X ] Documentation and Verification of current medications       Satisfactory Post Anesthetic Course

## 2019-06-20 RX ADMIN — Medication 600 MILLIGRAM(S): at 00:13

## 2019-06-20 RX ADMIN — OXYCODONE HYDROCHLORIDE 5 MILLIGRAM(S): 5 TABLET ORAL at 10:08

## 2019-06-20 RX ADMIN — HEPARIN SODIUM 5000 UNIT(S): 5000 INJECTION INTRAVENOUS; SUBCUTANEOUS at 05:52

## 2019-06-20 RX ADMIN — Medication 600 MILLIGRAM(S): at 18:27

## 2019-06-20 RX ADMIN — Medication 600 MILLIGRAM(S): at 18:57

## 2019-06-20 RX ADMIN — Medication 975 MILLIGRAM(S): at 11:00

## 2019-06-20 RX ADMIN — Medication 600 MILLIGRAM(S): at 05:52

## 2019-06-20 RX ADMIN — Medication 975 MILLIGRAM(S): at 15:59

## 2019-06-20 RX ADMIN — OXYCODONE HYDROCHLORIDE 5 MILLIGRAM(S): 5 TABLET ORAL at 01:15

## 2019-06-20 RX ADMIN — Medication 975 MILLIGRAM(S): at 17:00

## 2019-06-20 RX ADMIN — MAGNESIUM HYDROXIDE 30 MILLILITER(S): 400 TABLET, CHEWABLE ORAL at 12:33

## 2019-06-20 RX ADMIN — Medication 600 MILLIGRAM(S): at 13:30

## 2019-06-20 RX ADMIN — Medication 600 MILLIGRAM(S): at 07:02

## 2019-06-20 RX ADMIN — SIMETHICONE 80 MILLIGRAM(S): 80 TABLET, CHEWABLE ORAL at 05:53

## 2019-06-20 RX ADMIN — OXYCODONE HYDROCHLORIDE 5 MILLIGRAM(S): 5 TABLET ORAL at 00:13

## 2019-06-20 RX ADMIN — SIMETHICONE 80 MILLIGRAM(S): 80 TABLET, CHEWABLE ORAL at 10:09

## 2019-06-20 RX ADMIN — Medication 600 MILLIGRAM(S): at 01:15

## 2019-06-20 RX ADMIN — Medication 600 MILLIGRAM(S): at 12:33

## 2019-06-20 RX ADMIN — Medication 975 MILLIGRAM(S): at 10:09

## 2019-06-20 RX ADMIN — OXYCODONE HYDROCHLORIDE 5 MILLIGRAM(S): 5 TABLET ORAL at 11:00

## 2019-06-20 NOTE — PROGRESS NOTE ADULT - SUBJECTIVE AND OBJECTIVE BOX
SUBJECTIVE:    Pain: well controlled  Complaints:none    MILESTONES:    Alert and oriented x 3  [x  ]  Out of bed/ ambulating. [ x ]  Flatus: [x  ]  Postive [  ] Negative   Bowel movement  [  ] Positive [ x ] Negative   Voiding [ x ] Due to void [  ]   Diet: Regular [ x ]  Clears [  ]  NPO [  ]  Infant feeding:  Breast [ x ]   Bottle [  ]  Both [  ]  Feeding related inssues and/or concerns:none      OBJECTIVE:  T(C): 36.7 (19 @ 05:47), Max: 37.2 (19 @ 14:44)  HR: 66 (19 @ 05:47) (66 - 84)  BP: 131/81 (19 @ 05:47) (130/72 - 133/79)  RR: 16 (19 @ 05:47) (16 - 18)  SpO2: 100% (19 @ 05:47) (99% - 100%)  Wt(kg): --    MEDICATIONS  (STANDING):  acetaminophen   Tablet .. 975 milliGRAM(s) Oral every 6 hours  diphtheria/tetanus/pertussis (acellular) Vaccine (ADAcel) 0.5 milliLiter(s) IntraMuscular once  heparin  Injectable 5000 Unit(s) SubCutaneous every 12 hours  ibuprofen  Tablet. 600 milliGRAM(s) Oral every 6 hours    MEDICATIONS  (PRN):  diphenhydrAMINE 25 milliGRAM(s) Oral every 6 hours PRN Itching  docusate sodium 100 milliGRAM(s) Oral two times a day PRN Stool softening  glycerin Suppository - Adult 1 Suppository(s) Rectal at bedtime PRN Constipation  lanolin Ointment 1 Application(s) Topical every 6 hours PRN Sore Nipples  magnesium hydroxide Suspension 30 milliLiter(s) Oral two times a day PRN Constipation  oxyCODONE    IR 5 milliGRAM(s) Oral once PRN Severe Pain (7 - 10)  oxyCODONE    IR 5 milliGRAM(s) Oral every 3 hours PRN Moderate Pain (4 - 6)  simethicone 80 milliGRAM(s) Chew every 4 hours PRN Gas    ASSESSMENT:  28y  y/o G 2  P2  PO Day#   3     Delivery: Primary [  ]    Repeat [ x ]                                       Indication of procedure: previous c/s  Condition: Stable  Past Medical History significant for: HPI:  29yo female  EDC 19  presents@37.6wks for scheduled rltc.  +AP course PEC & GDMA1, otherwise unremarkable.   Denies VB,ROM or UCs today.  +FM (2019 06:57)    Current Issues:none  Heart:     RRR                         Lungs: clear  Breasts:  Soft [ x ]   Engorged [  ]  Abdomen: Soft [ x ] , distended [  ] nontender [  ]   Bowel sounds :  Present [ x ]  Absent [  ]   Fundus firm [ x ]  Boggy [  ]  Abdominal incision: Clean, dry and intact [x  ]  Staples [  ] Steri Strips [x  ] Dermabond [  ] Sutures [  ]  Patient wearing abdominal binder for support.  Vaginal: Lochia:  Heavy [  ]  Moderate [  ]   Scant [x  ]  Extremities: Edema [ 0 ] negative Neftali's Sign [x  ] Nontender Marino  [ x ] Positive pedal pulses [x  ]  Other relevant physical exam findings: none      PLAN:  Plan: Increase ambulation, analgesia PRN and pain medication protocol standing oxycodone, ibuprofen and acetaminophen.  Diet: Regular diet  Continue routine post-operative and postpartum care.   Discharge Planning [x  ]

## 2019-06-21 VITALS
RESPIRATION RATE: 18 BRPM | HEART RATE: 87 BPM | DIASTOLIC BLOOD PRESSURE: 79 MMHG | SYSTOLIC BLOOD PRESSURE: 138 MMHG | OXYGEN SATURATION: 99 % | TEMPERATURE: 98 F

## 2019-06-21 RX ORDER — TETANUS TOXOID, REDUCED DIPHTHERIA TOXOID AND ACELLULAR PERTUSSIS VACCINE, ADSORBED 5; 2.5; 8; 8; 2.5 [IU]/.5ML; [IU]/.5ML; UG/.5ML; UG/.5ML; UG/.5ML
0.5 SUSPENSION INTRAMUSCULAR ONCE
Refills: 0 | Status: COMPLETED | OUTPATIENT
Start: 2019-06-21 | End: 2019-06-21

## 2019-06-21 RX ADMIN — Medication 975 MILLIGRAM(S): at 05:18

## 2019-06-21 RX ADMIN — TETANUS TOXOID, REDUCED DIPHTHERIA TOXOID AND ACELLULAR PERTUSSIS VACCINE, ADSORBED 0.5 MILLILITER(S): 5; 2.5; 8; 8; 2.5 SUSPENSION INTRAMUSCULAR at 00:38

## 2019-06-21 RX ADMIN — Medication 975 MILLIGRAM(S): at 04:18

## 2019-07-19 ENCOUNTER — INPATIENT (INPATIENT)
Facility: HOSPITAL | Age: 29
LOS: 0 days | Discharge: HOME | DRG: 776 | End: 2019-07-19
Attending: INTERNAL MEDICINE | Admitting: INTERNAL MEDICINE
Payer: MEDICAID

## 2019-07-19 ENCOUNTER — TRANSCRIPTION ENCOUNTER (OUTPATIENT)
Age: 29
End: 2019-07-19

## 2019-07-19 VITALS
TEMPERATURE: 97 F | RESPIRATION RATE: 18 BRPM | WEIGHT: 195.11 LBS | HEIGHT: 64 IN | OXYGEN SATURATION: 100 % | SYSTOLIC BLOOD PRESSURE: 147 MMHG | HEART RATE: 66 BPM | DIASTOLIC BLOOD PRESSURE: 79 MMHG

## 2019-07-19 VITALS
OXYGEN SATURATION: 100 % | TEMPERATURE: 98 F | DIASTOLIC BLOOD PRESSURE: 68 MMHG | RESPIRATION RATE: 15 BRPM | HEART RATE: 52 BPM | SYSTOLIC BLOOD PRESSURE: 105 MMHG

## 2019-07-19 DIAGNOSIS — Z29.9 ENCOUNTER FOR PROPHYLACTIC MEASURES, UNSPECIFIED: ICD-10-CM

## 2019-07-19 DIAGNOSIS — R10.9 UNSPECIFIED ABDOMINAL PAIN: ICD-10-CM

## 2019-07-19 DIAGNOSIS — Z98.891 HISTORY OF UTERINE SCAR FROM PREVIOUS SURGERY: Chronic | ICD-10-CM

## 2019-07-19 DIAGNOSIS — E87.6 HYPOKALEMIA: ICD-10-CM

## 2019-07-19 DIAGNOSIS — N97.1 FEMALE INFERTILITY OF TUBAL ORIGIN: Chronic | ICD-10-CM

## 2019-07-19 DIAGNOSIS — K80.50 CALCULUS OF BILE DUCT WITHOUT CHOLANGITIS OR CHOLECYSTITIS WITHOUT OBSTRUCTION: ICD-10-CM

## 2019-07-19 LAB
ALBUMIN SERPL ELPH-MCNC: 3.6 G/DL — SIGNIFICANT CHANGE UP (ref 3.3–5)
ALP SERPL-CCNC: 99 U/L — SIGNIFICANT CHANGE UP (ref 30–120)
ALT FLD-CCNC: 39 U/L DA — SIGNIFICANT CHANGE UP (ref 10–60)
AMYLASE P1 CFR SERPL: 38 U/L — SIGNIFICANT CHANGE UP (ref 25–125)
ANION GAP SERPL CALC-SCNC: 5 MMOL/L — SIGNIFICANT CHANGE UP (ref 5–17)
ANION GAP SERPL CALC-SCNC: 6 MMOL/L — SIGNIFICANT CHANGE UP (ref 5–17)
APPEARANCE UR: CLEAR — SIGNIFICANT CHANGE UP
APTT BLD: 32.1 SEC — SIGNIFICANT CHANGE UP (ref 28.5–37)
AST SERPL-CCNC: 46 U/L — HIGH (ref 10–40)
BASOPHILS # BLD AUTO: 0.02 K/UL — SIGNIFICANT CHANGE UP (ref 0–0.2)
BASOPHILS # BLD AUTO: 0.03 K/UL — SIGNIFICANT CHANGE UP (ref 0–0.2)
BASOPHILS NFR BLD AUTO: 0.2 % — SIGNIFICANT CHANGE UP (ref 0–2)
BASOPHILS NFR BLD AUTO: 0.3 % — SIGNIFICANT CHANGE UP (ref 0–2)
BILIRUB SERPL-MCNC: 0.2 MG/DL — SIGNIFICANT CHANGE UP (ref 0.2–1.2)
BILIRUB UR-MCNC: NEGATIVE — SIGNIFICANT CHANGE UP
BUN SERPL-MCNC: 15 MG/DL — SIGNIFICANT CHANGE UP (ref 7–23)
BUN SERPL-MCNC: 9 MG/DL — SIGNIFICANT CHANGE UP (ref 7–23)
CALCIUM SERPL-MCNC: 8.4 MG/DL — SIGNIFICANT CHANGE UP (ref 8.4–10.5)
CALCIUM SERPL-MCNC: 9 MG/DL — SIGNIFICANT CHANGE UP (ref 8.4–10.5)
CHLORIDE SERPL-SCNC: 104 MMOL/L — SIGNIFICANT CHANGE UP (ref 96–108)
CHLORIDE SERPL-SCNC: 108 MMOL/L — SIGNIFICANT CHANGE UP (ref 96–108)
CO2 SERPL-SCNC: 29 MMOL/L — SIGNIFICANT CHANGE UP (ref 22–31)
CO2 SERPL-SCNC: 29 MMOL/L — SIGNIFICANT CHANGE UP (ref 22–31)
COLOR SPEC: YELLOW — SIGNIFICANT CHANGE UP
CREAT SERPL-MCNC: 0.55 MG/DL — SIGNIFICANT CHANGE UP (ref 0.5–1.3)
CREAT SERPL-MCNC: 0.7 MG/DL — SIGNIFICANT CHANGE UP (ref 0.5–1.3)
D DIMER BLD IA.RAPID-MCNC: 185 NG/ML DDU — SIGNIFICANT CHANGE UP
DIFF PNL FLD: NEGATIVE — SIGNIFICANT CHANGE UP
EOSINOPHIL # BLD AUTO: 0.02 K/UL — SIGNIFICANT CHANGE UP (ref 0–0.5)
EOSINOPHIL # BLD AUTO: 0.15 K/UL — SIGNIFICANT CHANGE UP (ref 0–0.5)
EOSINOPHIL NFR BLD AUTO: 0.2 % — SIGNIFICANT CHANGE UP (ref 0–6)
EOSINOPHIL NFR BLD AUTO: 1.7 % — SIGNIFICANT CHANGE UP (ref 0–6)
GLUCOSE SERPL-MCNC: 105 MG/DL — HIGH (ref 70–99)
GLUCOSE SERPL-MCNC: 83 MG/DL — SIGNIFICANT CHANGE UP (ref 70–99)
GLUCOSE UR QL: NEGATIVE MG/DL — SIGNIFICANT CHANGE UP
HCT VFR BLD CALC: 38.9 % — SIGNIFICANT CHANGE UP (ref 34.5–45)
HCT VFR BLD CALC: 41.1 % — SIGNIFICANT CHANGE UP (ref 34.5–45)
HGB BLD-MCNC: 11.9 G/DL — SIGNIFICANT CHANGE UP (ref 11.5–15.5)
HGB BLD-MCNC: 12.8 G/DL — SIGNIFICANT CHANGE UP (ref 11.5–15.5)
IMM GRANULOCYTES NFR BLD AUTO: 0.1 % — SIGNIFICANT CHANGE UP (ref 0–1.5)
IMM GRANULOCYTES NFR BLD AUTO: 0.2 % — SIGNIFICANT CHANGE UP (ref 0–1.5)
INR BLD: 0.94 RATIO — SIGNIFICANT CHANGE UP (ref 0.88–1.16)
KETONES UR-MCNC: NEGATIVE — SIGNIFICANT CHANGE UP
LACTATE SERPL-SCNC: 1.1 MMOL/L — SIGNIFICANT CHANGE UP (ref 0.7–2)
LEUKOCYTE ESTERASE UR-ACNC: NEGATIVE — SIGNIFICANT CHANGE UP
LIDOCAIN IGE QN: 188 U/L — SIGNIFICANT CHANGE UP (ref 73–393)
LYMPHOCYTES # BLD AUTO: 2.69 K/UL — SIGNIFICANT CHANGE UP (ref 1–3.3)
LYMPHOCYTES # BLD AUTO: 32.4 % — SIGNIFICANT CHANGE UP (ref 13–44)
LYMPHOCYTES # BLD AUTO: 4.42 K/UL — HIGH (ref 1–3.3)
LYMPHOCYTES # BLD AUTO: 49.1 % — HIGH (ref 13–44)
MCHC RBC-ENTMCNC: 24.4 PG — LOW (ref 27–34)
MCHC RBC-ENTMCNC: 24.7 PG — LOW (ref 27–34)
MCHC RBC-ENTMCNC: 30.6 GM/DL — LOW (ref 32–36)
MCHC RBC-ENTMCNC: 31.1 GM/DL — LOW (ref 32–36)
MCV RBC AUTO: 79.2 FL — LOW (ref 80–100)
MCV RBC AUTO: 79.7 FL — LOW (ref 80–100)
MONOCYTES # BLD AUTO: 0.47 K/UL — SIGNIFICANT CHANGE UP (ref 0–0.9)
MONOCYTES # BLD AUTO: 0.6 K/UL — SIGNIFICANT CHANGE UP (ref 0–0.9)
MONOCYTES NFR BLD AUTO: 5.7 % — SIGNIFICANT CHANGE UP (ref 2–14)
MONOCYTES NFR BLD AUTO: 6.7 % — SIGNIFICANT CHANGE UP (ref 2–14)
NEUTROPHILS # BLD AUTO: 3.78 K/UL — SIGNIFICANT CHANGE UP (ref 1.8–7.4)
NEUTROPHILS # BLD AUTO: 5.09 K/UL — SIGNIFICANT CHANGE UP (ref 1.8–7.4)
NEUTROPHILS NFR BLD AUTO: 42 % — LOW (ref 43–77)
NEUTROPHILS NFR BLD AUTO: 61.4 % — SIGNIFICANT CHANGE UP (ref 43–77)
NITRITE UR-MCNC: NEGATIVE — SIGNIFICANT CHANGE UP
NRBC # BLD: 0 /100 WBCS — SIGNIFICANT CHANGE UP (ref 0–0)
NRBC # BLD: 0 /100 WBCS — SIGNIFICANT CHANGE UP (ref 0–0)
PH UR: 8 — SIGNIFICANT CHANGE UP (ref 5–8)
PLATELET # BLD AUTO: 190 K/UL — SIGNIFICANT CHANGE UP (ref 150–400)
PLATELET # BLD AUTO: 201 K/UL — SIGNIFICANT CHANGE UP (ref 150–400)
POTASSIUM SERPL-MCNC: 3.4 MMOL/L — LOW (ref 3.5–5.3)
POTASSIUM SERPL-MCNC: 4.3 MMOL/L — SIGNIFICANT CHANGE UP (ref 3.5–5.3)
POTASSIUM SERPL-SCNC: 3.4 MMOL/L — LOW (ref 3.5–5.3)
POTASSIUM SERPL-SCNC: 4.3 MMOL/L — SIGNIFICANT CHANGE UP (ref 3.5–5.3)
PROT SERPL-MCNC: 7 G/DL — SIGNIFICANT CHANGE UP (ref 6–8.3)
PROT UR-MCNC: NEGATIVE MG/DL — SIGNIFICANT CHANGE UP
PROTHROM AB SERPL-ACNC: 10.2 SEC — SIGNIFICANT CHANGE UP (ref 10–12.9)
RBC # BLD: 4.88 M/UL — SIGNIFICANT CHANGE UP (ref 3.8–5.2)
RBC # BLD: 5.19 M/UL — SIGNIFICANT CHANGE UP (ref 3.8–5.2)
RBC # FLD: 17.7 % — HIGH (ref 10.3–14.5)
RBC # FLD: 18.1 % — HIGH (ref 10.3–14.5)
SODIUM SERPL-SCNC: 139 MMOL/L — SIGNIFICANT CHANGE UP (ref 135–145)
SODIUM SERPL-SCNC: 142 MMOL/L — SIGNIFICANT CHANGE UP (ref 135–145)
SP GR SPEC: 1.01 — SIGNIFICANT CHANGE UP (ref 1.01–1.02)
UROBILINOGEN FLD QL: NEGATIVE MG/DL — SIGNIFICANT CHANGE UP
WBC # BLD: 8.3 K/UL — SIGNIFICANT CHANGE UP (ref 3.8–10.5)
WBC # BLD: 9 K/UL — SIGNIFICANT CHANGE UP (ref 3.8–10.5)
WBC # FLD AUTO: 8.3 K/UL — SIGNIFICANT CHANGE UP (ref 3.8–10.5)
WBC # FLD AUTO: 9 K/UL — SIGNIFICANT CHANGE UP (ref 3.8–10.5)

## 2019-07-19 PROCEDURE — 99223 1ST HOSP IP/OBS HIGH 75: CPT

## 2019-07-19 PROCEDURE — 99285 EMERGENCY DEPT VISIT HI MDM: CPT

## 2019-07-19 PROCEDURE — 71275 CT ANGIOGRAPHY CHEST: CPT | Mod: 26

## 2019-07-19 PROCEDURE — 71045 X-RAY EXAM CHEST 1 VIEW: CPT | Mod: 26

## 2019-07-19 PROCEDURE — 76705 ECHO EXAM OF ABDOMEN: CPT | Mod: 26

## 2019-07-19 PROCEDURE — 99239 HOSP IP/OBS DSCHRG MGMT >30: CPT

## 2019-07-19 PROCEDURE — 74174 CTA ABD&PLVS W/CONTRAST: CPT | Mod: 26

## 2019-07-19 RX ORDER — POTASSIUM CHLORIDE 20 MEQ
10 PACKET (EA) ORAL
Refills: 0 | Status: COMPLETED | OUTPATIENT
Start: 2019-07-19 | End: 2019-07-19

## 2019-07-19 RX ORDER — SODIUM CHLORIDE 9 MG/ML
1000 INJECTION INTRAMUSCULAR; INTRAVENOUS; SUBCUTANEOUS ONCE
Refills: 0 | Status: COMPLETED | OUTPATIENT
Start: 2019-07-19 | End: 2019-07-19

## 2019-07-19 RX ORDER — FAMOTIDINE 10 MG/ML
1 INJECTION INTRAVENOUS
Qty: 60 | Refills: 0
Start: 2019-07-19 | End: 2019-08-17

## 2019-07-19 RX ORDER — ONDANSETRON 8 MG/1
4 TABLET, FILM COATED ORAL ONCE
Refills: 0 | Status: COMPLETED | OUTPATIENT
Start: 2019-07-19 | End: 2019-07-19

## 2019-07-19 RX ORDER — SODIUM CHLORIDE 9 MG/ML
1000 INJECTION, SOLUTION INTRAVENOUS
Refills: 0 | Status: DISCONTINUED | OUTPATIENT
Start: 2019-07-19 | End: 2019-07-19

## 2019-07-19 RX ORDER — KETOROLAC TROMETHAMINE 30 MG/ML
30 SYRINGE (ML) INJECTION ONCE
Refills: 0 | Status: DISCONTINUED | OUTPATIENT
Start: 2019-07-19 | End: 2019-07-19

## 2019-07-19 RX ORDER — MORPHINE SULFATE 50 MG/1
4 CAPSULE, EXTENDED RELEASE ORAL EVERY 4 HOURS
Refills: 0 | Status: DISCONTINUED | OUTPATIENT
Start: 2019-07-19 | End: 2019-07-19

## 2019-07-19 RX ORDER — MORPHINE SULFATE 50 MG/1
2 CAPSULE, EXTENDED RELEASE ORAL EVERY 4 HOURS
Refills: 0 | Status: DISCONTINUED | OUTPATIENT
Start: 2019-07-19 | End: 2019-07-19

## 2019-07-19 RX ORDER — MORPHINE SULFATE 50 MG/1
1 CAPSULE, EXTENDED RELEASE ORAL EVERY 4 HOURS
Refills: 0 | Status: DISCONTINUED | OUTPATIENT
Start: 2019-07-19 | End: 2019-07-19

## 2019-07-19 RX ORDER — FAMOTIDINE 10 MG/ML
20 INJECTION INTRAVENOUS
Refills: 0 | Status: DISCONTINUED | OUTPATIENT
Start: 2019-07-19 | End: 2019-07-19

## 2019-07-19 RX ORDER — MORPHINE SULFATE 50 MG/1
2 CAPSULE, EXTENDED RELEASE ORAL ONCE
Refills: 0 | Status: DISCONTINUED | OUTPATIENT
Start: 2019-07-19 | End: 2019-07-19

## 2019-07-19 RX ADMIN — Medication 30 MILLIGRAM(S): at 01:31

## 2019-07-19 RX ADMIN — MORPHINE SULFATE 2 MILLIGRAM(S): 50 CAPSULE, EXTENDED RELEASE ORAL at 15:20

## 2019-07-19 RX ADMIN — SODIUM CHLORIDE 1000 MILLILITER(S): 9 INJECTION INTRAMUSCULAR; INTRAVENOUS; SUBCUTANEOUS at 00:20

## 2019-07-19 RX ADMIN — MORPHINE SULFATE 2 MILLIGRAM(S): 50 CAPSULE, EXTENDED RELEASE ORAL at 15:05

## 2019-07-19 RX ADMIN — Medication 100 MILLIEQUIVALENT(S): at 05:59

## 2019-07-19 RX ADMIN — MORPHINE SULFATE 2 MILLIGRAM(S): 50 CAPSULE, EXTENDED RELEASE ORAL at 02:35

## 2019-07-19 RX ADMIN — Medication 100 MILLIEQUIVALENT(S): at 09:10

## 2019-07-19 RX ADMIN — FAMOTIDINE 20 MILLIGRAM(S): 10 INJECTION INTRAVENOUS at 07:00

## 2019-07-19 RX ADMIN — SODIUM CHLORIDE 125 MILLILITER(S): 9 INJECTION, SOLUTION INTRAVENOUS at 05:59

## 2019-07-19 RX ADMIN — ONDANSETRON 4 MILLIGRAM(S): 8 TABLET, FILM COATED ORAL at 02:44

## 2019-07-19 RX ADMIN — FAMOTIDINE 20 MILLIGRAM(S): 10 INJECTION INTRAVENOUS at 17:43

## 2019-07-19 RX ADMIN — MORPHINE SULFATE 2 MILLIGRAM(S): 50 CAPSULE, EXTENDED RELEASE ORAL at 06:51

## 2019-07-19 RX ADMIN — SODIUM CHLORIDE 1000 MILLILITER(S): 9 INJECTION INTRAMUSCULAR; INTRAVENOUS; SUBCUTANEOUS at 01:29

## 2019-07-19 RX ADMIN — MORPHINE SULFATE 2 MILLIGRAM(S): 50 CAPSULE, EXTENDED RELEASE ORAL at 03:21

## 2019-07-19 RX ADMIN — Medication 100 MILLIEQUIVALENT(S): at 07:07

## 2019-07-19 RX ADMIN — SODIUM CHLORIDE 1000 MILLILITER(S): 9 INJECTION INTRAMUSCULAR; INTRAVENOUS; SUBCUTANEOUS at 01:20

## 2019-07-19 RX ADMIN — SODIUM CHLORIDE 125 MILLILITER(S): 9 INJECTION, SOLUTION INTRAVENOUS at 06:53

## 2019-07-19 RX ADMIN — Medication 30 MILLIGRAM(S): at 00:53

## 2019-07-19 RX ADMIN — MORPHINE SULFATE 2 MILLIGRAM(S): 50 CAPSULE, EXTENDED RELEASE ORAL at 07:16

## 2019-07-19 RX ADMIN — SODIUM CHLORIDE 1000 MILLILITER(S): 9 INJECTION INTRAMUSCULAR; INTRAVENOUS; SUBCUTANEOUS at 02:29

## 2019-07-19 NOTE — H&P ADULT - NSHPREVIEWOFSYSTEMS_GEN_ALL_CORE
-    CONSTITUTIONAL: No fever, weight loss, or fatigue.  EYES: No eye pain, visual disturbances, or discharge.  ENMT:  No difficulty hearing, tinnitus, vertigo; No sinus or throat pain.  NECK: No pain or stiffness.	  RESPIRATORY: No cough, wheezing, or hemoptysis; No shortness of breath.  CARDIOVASCULAR: No chest pain, palpitations, dizziness, or leg swelling.  GASTROINTESTINAL:(+) abdominal pain, no nausea, vomiting, or hematemesis; No diarrhea or Change in bowel habits. No melena or hematochezia.  GENITOURINARY: No dysuria, frequency, hematuria, or incontinence.  NEUROLOGICAL: No headaches, focal muscle weakness, numbness, or tremors.  SKIN: No itching, burning or rashes.  MUSCULOSKELETAL: No joint swelling or pain.  PSYCHIATRIC: No depression, anxiety, or agitation.  HEME/LYMPH: No easy bruising, bleeding gums, or nose bleed.  ALLERGY AND IMMUNOLOGIC: No hives or eczema.

## 2019-07-19 NOTE — PROGRESS NOTE ADULT - PROBLEM SELECTOR PLAN 3
IMPROVE VTE Individual Risk Assessment          RISK                                                          Points    [  ] Previous VTE                                                3  [  ] Thrombophilia                                             2  [  ] Lower limb paralysis                                    2        (unable to hold up >15 seconds)    [  ] Current Cancer                                             2         (within 6 months)  [ x ] Immobilization > 24 hrs                              1  [  ] ICU/CCU stay > 24 hours                            1  [  ] Age > 60                                                    1    IMPROVE VTE Score 1.    ** Patient is at low risk for VTE, IMPROVE score of one, will start B/L intermittent pneumatic compression device while in bed for mechanical DVT prophylaxis, in addition to ambulation.

## 2019-07-19 NOTE — H&P ADULT - NSHPPHYSICALEXAM_GEN_ALL_CORE
-    Vital Signs Last 24 Hrs  T(C): 36.3 (19 Jul 2019 00:17), Max: 36.3 (19 Jul 2019 00:17)  T(F): 97.4 (19 Jul 2019 00:17), Max: 97.4 (19 Jul 2019 00:17)  HR: 54 (19 Jul 2019 03:21) (54 - 69)  BP: 144/86 (19 Jul 2019 03:21) (127/80 - 147/79)  BP(mean): --  RR: 19 (19 Jul 2019 03:21) (18 - 20)  SpO2: 100% (19 Jul 2019 03:21) (99% - 100%)        PHYSICAL EXAM:  	  GENERAL: NAD, well-groomed, well-developed, obese.  HEAD:  Atraumatic, Norm cephalic.  EYES: PERRLA, conjunctiva clear.  ENMT: no nasal discharge, no marky-pharyngeal erythema or exudates, MMM.   NECK: Supple, No JVD.  NERVOUS SYSTEM:  Alert & oriented X3, neurologically intact grossly.  CHEST/LUNG: Good air entry B/L, no rales, rhonchi, or wheezing.  HEART: Normal S1 & S2, no murmurs, or extra sounds.  ABDOMEN: Soft, mildly tender epigastrium, no guarding or rebound tenderness, obese non-distended; bowel sounds present, no palpable masses or organomegaly, negative Hawthorne's sign..  EXTREMITIES:  No clubbing, cyanosis, or edema.  VASCULAR: 2+ radial, DPA / PTA pulses B/L.  SKIN: No rashes or lesions.  PSYCH: normal affect & behavior.

## 2019-07-19 NOTE — PROGRESS NOTE ADULT - SUBJECTIVE AND OBJECTIVE BOX
Patient is a 28y old  Female who presents with a chief complaint of Epigastric pain. (2019 03:38)        HPI:  This is a 28 years old F with PMH of obesity and a recent C/S delivery about a month ago presented with "20/10" epigastric pain that started about 3 hours after her dinner, pain is constant dull aching localized to the epigastric area, radiating to the mid upper back, not associated with nausea/vomiting, or diaphoresis, no chest pain or SOB, no fever, chills, or diarrhea. Patient stated that she got same pain for the 1st time during her last month of pregnancy, was told by her doctor that it is the baby who induces this pain, but pain started again about 2 weeks following her C/S, and since then it comes & goes but was never so severe like this time. Patient drinks bear "socially", denies NSAIDS intake. Patient is not breast feeding. (2019 03:38)      SUBJECTIVE & OBJECTIVE: Pt seen and examined at bedside, no acute complaitns     PHYSICAL EXAM:  T(C): 36.6 (19 @ 07:56), Max: 36.9 (19 @ 06:45)  HR: 50 (19 @ 07:56) (50 - 69)  BP: 111/78 (19 @ 07:56) (111/78 - 147/79)  RR: 16 (19 @ 07:56) (15 - 20)  SpO2: 100% (19 @ 07:56) (99% - 100%)  Wt(kg): -- Height (cm): 162.56 ( @ 00:17)  Weight (kg): 88.5 ( @ 00:17)  BMI (kg/m2): 33.5 ( @ 00:17)  BSA (m2): 1.94 ( @ 00:17)  GENERAL: NAD, well-groomed, well-developed  HEAD:  Atraumatic, Normocephalic  EYES: EOMI, PERRLA, conjunctiva and sclera clear  ENMT: Moist mucous membranes  NECK: Supple, No JVD  NERVOUS SYSTEM:  Alert & Oriented X3, Motor Strength 5/5 B/L upper and lower extremities; DTRs 2+ intact and symmetric  CHEST/LUNG: Clear to auscultation bilaterally; No rales, rhonchi, wheezing, or rubs  HEART: Regular rate and rhythm; No murmurs, rubs, or gallops  ABDOMEN: Soft, Nontender, Nondistended; Bowel sounds present  EXTREMITIES:  2+ Peripheral Pulses, No clubbing, cyanosis, or edema        MEDICATIONS  (STANDING):  famotidine    Tablet 20 milliGRAM(s) Oral two times a day  lactated ringers. 1000 milliLiter(s) (125 mL/Hr) IV Continuous <Continuous>    MEDICATIONS  (PRN):  morphine  - Injectable 2 milliGRAM(s) IV Push every 4 hours PRN Moderate Pain (4 - 6)  morphine  - Injectable 4 milliGRAM(s) IV Push every 4 hours PRN Severe Pain (7 - 10)  morphine  - Injectable 1 milliGRAM(s) IV Push every 4 hours PRN Mild Pain (1 - 3)      LABS:                        11.9   8.30  )-----------( 190      ( 2019 05:29 )             38.9     07-19    142  |  108  |  9   ----------------------------<  105<H>  4.3   |  29  |  0.55    Ca    8.4      2019 05:29    TPro  7.0  /  Alb  3.6  /  TBili  0.2  /  DBili  x   /  AST  46<H>  /  ALT  39  /  AlkPhos  99  07-19    PT/INR - ( 2019 01:39 )   PT: 10.2 sec;   INR: 0.94 ratio         PTT - ( 2019 01:39 )  PTT:32.1 sec  Urinalysis Basic - ( 2019 02:31 )    Color: Yellow / Appearance: Clear / S.010 / pH: x  Gluc: x / Ketone: Negative  / Bili: Negative / Urobili: Negative mg/dL   Blood: x / Protein: Negative mg/dL / Nitrite: Negative   Leuk Esterase: Negative / RBC: x / WBC x   Sq Epi: x / Non Sq Epi: x / Bacteria: x        CAPILLARY BLOOD GLUCOSE          CAPILLARY BLOOD GLUCOSE        CAPILLARY BLOOD GLUCOSE                RECENT CULTURES:      RADIOLOGY & ADDITIONAL TESTS:                        DVT/GI ppx  Discussed with pt @ bedside

## 2019-07-19 NOTE — H&P ADULT - PROBLEM SELECTOR PLAN 1
with Dilated CBD, no stones seen, normal LFTs, NPO except for meds, IVF hydration w/ LR @ 150 ml/h, pain control, possible MRCP/ ERCP, GI consult with Dr. Foley was called.

## 2019-07-19 NOTE — ED ADULT NURSE NOTE - CHPI ED NUR SYMPTOMS NEG
no nausea/no vomiting/no chills/no dysuria/no abdominal distension/no blood in stool/no burning urination/no diarrhea/no hematuria/no fever

## 2019-07-19 NOTE — DISCHARGE NOTE PROVIDER - CARE PROVIDER_API CALL
Greg Burger)  Gastroenterology  [address] [place] [address]  Phone: [phone]  Fax: [phone]  Follow Up Time:

## 2019-07-19 NOTE — ED PROVIDER NOTE - OBJECTIVE STATEMENT
28 y.o. F c/o epigastric pain, tight, radiating across upper back, started about 30min PTA, pt had  without complication 1 month ago at [facility], during last week of pregnancy was having similar discomfort, was told was baby pushing on upper abdomen, for the past few days has been feeling it again, but much worse tonight, hasn't eaten/had anything to drink since about 6p, no fever, no n/v, no diarrhea 28 y.o. F c/o epigastric pain, tight, radiating across upper back, started about 30min PTA, pt had  without complication 1 month ago at [facility], during last week of pregnancy was having similar discomfort, was told was baby pushing on upper abdomen, for the past few days has been feeling it again, but much worse tonight, hasn't eaten/had anything to drink since about 6p, no fever, no n/v, no diarrhea, pt is not breastfeeding

## 2019-07-19 NOTE — H&P ADULT - NSHPLABSRESULTS_GEN_ALL_CORE
-                           11.9   8.30  )-----------( 190      ( 2019 05:29 )             38.9            07-19    142  |  108  |  9   ----------------------------<  105<H>  4.3   |  29  |  0.55    Ca    8.4      2019 05:29    TPro  7.0  /  Alb  3.6  /  TBili  0.2  /  DBili  x   /  AST  46<H>  /  ALT  39  /  AlkPhos  99  07-19          Urinalysis Basic - ( 2019 02:31 )    Color: Yellow / Appearance: Clear / S.010 / pH: x  Gluc: x / Ketone: Negative  / Bili: Negative / Urobili: Negative mg/dL   Blood: x / Protein: Negative mg/dL / Nitrite: Negative   Leuk Esterase: Negative / RBC: x / WBC x   Sq Epi: x / Non Sq Epi: x / Bacteria: x      PT/INR - ( 2019 01:39 )   PT: 10.2 sec;   INR: 0.94 ratio       PTT - ( 2019 01:39 )  PTT:32.1 sec      CTA CHEST/ABDOMEN/PELVIS r/o aortic dissection:  Ordered, pending.        CXR:	    As per my review shows normal cardiac shadow size, clear lung fields B/L, no pulmonary infiltrates, pleural effusion, or pneumothorax. Pending official report.            EKG:    As per my review shows SB at 55/min, with SN arrhythmia, normal NC & QTc intervals, normal QRS voltage, duration, and axis (+90), with normal transition, no ST-T abnormality.        - -                           11.9   8.30  )-----------( 190      ( 2019 05:29 )             38.9            07-19    142  |  108  |  9   ----------------------------<  105<H>  4.3   |  29  |  0.55    Ca    8.4      2019 05:29    TPro  7.0  /  Alb  3.6  /  TBili  0.2  /  DBili  x   /  AST  46<H>  /  ALT  39  /  AlkPhos  99  07-19          Urinalysis Basic - ( 2019 02:31 )    Color: Yellow / Appearance: Clear / S.010 / pH: x  Gluc: x / Ketone: Negative  / Bili: Negative / Urobili: Negative mg/dL   Blood: x / Protein: Negative mg/dL / Nitrite: Negative   Leuk Esterase: Negative / RBC: x / WBC x   Sq Epi: x / Non Sq Epi: x / Bacteria: x      PT/INR - ( 2019 01:39 )   PT: 10.2 sec;   INR: 0.94 ratio       PTT - ( 2019 01:39 )  PTT:32.1 sec           CT ANGIO ABD PELV (W)AW IC                          VASCULATURE:    Aorta: No aneurysm or dissection.    Celiac trunk and mesenteric arteries: No occlusion or significant   stenosis.    Renalarteries: No occlusion or significant stenosis.    Right iliac arteries: No occlusion or significant stenosis.    Left iliac arteries: No occlusion or significant stenosis.   ABDOMEN:    Liver: Unremarkable.    Gallbladder and bile ducts: Distendedgallbladder. No calcified stones.   Biliary dilatation. Common hepatic and proximal common bile duct dilated   to 8   mm suggesting there may be distal stenosis or early obstruction. .    Pancreas: Unremarkable.    Spleen: Normal size.    Adrenals: No mass.    Kidneys and ureters: Unremarkable.    Stomach and bowel: No inflammation or obstruction.    Appendix: Normal.   PELVIS:    Bladder: Unremarkable.     Reproductive: Unremarkable uterus and ovaries.   ABDOMEN and PELVIS:    Intraperitoneal space: Unremarkable.    Bones/joints: No lower thoracic or lumbar spine fractures.    Soft tissues: Unremarkable.    Lymph nodes: No adenopathy.   IMPRESSION:   1. Distended gallbladder and biliary dilatation including common hepatic   and   proximal CBD dilated to 8 mm suggesting distal stenosis or early   obstruction.   No cause identified.   2. No abdominal aortic aneurysm, dissection, major branch vessel   occlusion or   stenosis.          CT Angiography Chest With Contrast     1. No pulmonary embolism.   2. No thoracic aortic aneurysm or dissection..           CXR:	    As per my review shows normal cardiac shadow size, clear lung fields B/L, no pulmonary infiltrates, pleural effusion, or pneumothorax. Pending official report.          EKG:    As per my review shows SB at 55/min, with SN arrhythmia, normal FL & QTc intervals, normal QRS voltage, duration, and axis (+90), with normal transition, no ST-T abnormality.        -

## 2019-07-19 NOTE — CONSULT NOTE ADULT - SUBJECTIVE AND OBJECTIVE BOX
Chief Complaint:  Patient is a 28y old  Female who presents with a chief complaint of Epigastric pain. (2019 12:14)    No pertinent past medical history  H/O:      HPI:  This is a 28 years old F with PMH of obesity and a recent C/S delivery about a month ago presented with "20/10" epigastric pain that started about 3 hours after her dinner, pain is constant dull aching localized to the epigastric area, radiating to the mid upper back, not associated with nausea/vomiting, or diaphoresis, no chest pain or SOB, no fever, chills, or diarrhea. Patient stated that she got same pain for the 1st time during her last month of pregnancy, was told by her doctor that it is the baby who induces this pain, but pain started again about 2 weeks following her C/S, and since then it comes & goes but was never so severe like this time. Patient drinks bear "socially", denies NSAIDS intake. Patient is not breast feeding. (2019 03:38)      No Known Allergies      famotidine    Tablet 20 milliGRAM(s) Oral two times a day  lactated ringers. 1000 milliLiter(s) IV Continuous <Continuous>  morphine  - Injectable 2 milliGRAM(s) IV Push every 4 hours PRN  morphine  - Injectable 4 milliGRAM(s) IV Push every 4 hours PRN  morphine  - Injectable 1 milliGRAM(s) IV Push every 4 hours PRN        FAMILY HISTORY:  No pertinent family history in first degree relatives        Review of Systems:    General:  No wt loss, fevers, chills, night sweats,fatigue,   Eyes:  Good vision, no reported pain  ENT:  No sore throat, pain, runny nose, dysphagia  CV:  No pain, palpitatioins, hypo/hypertension  Resp:  No dyspnea, cough, tachypnea, wheezing  :  No pain, bleeding, incontinence, nocturia  Muscle:  No pain, weakness  Neuro:  No weakness, tingling, memory problems  Psych:  No fatigue, insomnia, mood problems, depression  Endocrine:  No polyuria, polydypsia, cold/heat intolerance  Heme:  No petechiae, ecchymosis, easy bruisability  Skin:  No rash, tattoos, scars, edema    Relevant Family History:       Relevant Social History:       Physical Exam:    Vital Signs:  Vital Signs Last 24 Hrs  T(C): 36.6 (2019 07:56), Max: 36.9 (2019 06:45)  T(F): 97.8 (2019 07:56), Max: 98.5 (2019 06:45)  HR: 50 (2019 07:56) (50 - 69)  BP: 111/78 (2019 07:56) (111/78 - 147/79)  BP(mean): --  RR: 16 (2019 07:56) (15 - 20)  SpO2: 100% (2019 07:56) (99% - 100%)  Daily Height in cm: 162.56 (2019 00:17)    Daily     General:  Appears stated age, well-groomed, well-nourished, no distress  HEENT:  NC/AT,  conjunctivae clear and pink, no thyromegaly, nodules, adenopathy, no JVD  Chest:  Full & symmetric excursion, no increased effort, breath sounds clear  Cardiovascular:  Regular rhythm, S1, S2, no murmur/rub/S3/S4, no abdominal bruit, no edema  Abdomen:  Soft, non-tender, non-distended, normoactive bowel sounds,  no masses ,no hepatosplenomeagaly, no signs of chronic liver disease  Extremities:  no cyanosis,clubbing or edema  Skin:  No rash/erythema/ecchymoses/petechiae/wounds/abscess/warm/dry  Neuro/Psych:  Alert, oriented, no asterixis, no tremor, no encephalopathy    Laboratory:                            11.9   8.30  )-----------( 190      ( 2019 05:29 )             38.9     07-19    142  |  108  |  9   ----------------------------<  105<H>  4.3   |  29  |  0.55    Ca    8.4      2019 05:29    TPro  7.0  /  Alb  3.6  /  TBili  0.2  /  DBili  x   /  AST  46<H>  /  ALT  39  /  AlkPhos  99  07-19    LIVER FUNCTIONS - ( 2019 01:39 )  Alb: 3.6 g/dL / Pro: 7.0 g/dL / ALK PHOS: 99 U/L / ALT: 39 U/L DA / AST: 46 U/L / GGT: x           PT/INR - ( 2019 01:39 )   PT: 10.2 sec;   INR: 0.94 ratio         PTT - ( 2019 01:39 )  PTT:32.1 sec  Urinalysis Basic - ( 2019 02:31 )    Color: Yellow / Appearance: Clear / S.010 / pH: x  Gluc: x / Ketone: Negative  / Bili: Negative / Urobili: Negative mg/dL   Blood: x / Protein: Negative mg/dL / Nitrite: Negative   Leuk Esterase: Negative / RBC: x / WBC x   Sq Epi: x / Non Sq Epi: x / Bacteria: x      Amylase Serum38      Lipase serum--       Ammonia--  Amylase Serum--      Lipase [id number]       Ammonia--    Imaging:

## 2019-07-19 NOTE — DISCHARGE NOTE PROVIDER - HOSPITAL COURSE
28 years old F with PMH of obesity and a recent C/S delivery about a month ago presented with severe epigastric pain.            Intractable abdominal pain.   dilated common bile duct measuring about 8mm with abdominal pain    pt recently had csection a month ago and since then been having on/off abdominal pain,     gi consulted, pt was asmytpomatic    normal liver enzymes,     ct with no acute findings has 8 mm distal duct     would need outpt mrcp    gi cleared for d/c, toelrated diet    would need outpt gi f/u        hypokalemia    resolved

## 2019-07-19 NOTE — H&P ADULT - ASSESSMENT
28 years old F with PMH of obesity and a recent C/S delivery about a month ago presented with severe epigastric pain.

## 2019-07-19 NOTE — PROGRESS NOTE ADULT - PROBLEM SELECTOR PLAN 1
dilated common bile duct measuring about 8mm with abdominal pain  pt recently had csection a month ago and since then been having on/off abdominal pain, possible postpartum cholethasis  continue npo  iv fluids  gi consulted

## 2019-07-19 NOTE — ED PROVIDER NOTE - PROGRESS NOTE DETAILS
pt admitted for gi c/s and possible HIDA tomorrow, Dr. Tejada has seen pt, will add cta c/a/p to r/o dissection, pt's pain 2/10 now

## 2019-07-19 NOTE — DISCHARGE NOTE PROVIDER - NSDCCPCAREPLAN_GEN_ALL_CORE_FT
PRINCIPAL DISCHARGE DIAGNOSIS  Diagnosis: Biliary colic  Assessment and Plan of Treatment: would need outpt MRCP, and gi follow up  tolerating diet

## 2019-07-19 NOTE — ED ADULT TRIAGE NOTE - CHIEF COMPLAINT QUOTE
'Abdominal pain going to the back', c/o upper abdominal pain radiating to back started an hour ago. Denies N/V/D. Recent  c section 1 month ago.

## 2019-07-19 NOTE — H&P ADULT - HISTORY OF PRESENT ILLNESS
This is a 28 years old F with PMH of obesity and a recent C/S delivery about a month ago presented with "20/10" epigastric pain that started about 3 hours after her dinner, pain is constant dull aching localized to the epigastric area, radiating to the mid upper back, not associated with nausea/vomiting, or diaphoresis, no chest pain or SOB, no fever, chills, or diarrhea. Patient stated that she got same pain for the 1st time during her last month of pregnancy, was told by her doctor that it is the baby who induces this pain, but pain started again about 2 weeks following her C/S, and since then it comes & goes but was never so severe like this time. Patient drinks bear "socially", denies NSAIDS intake. Patient is not breast feeding.

## 2019-07-19 NOTE — DISCHARGE NOTE NURSING/CASE MANAGEMENT/SOCIAL WORK - NSDCDPATPORTLINK_GEN_ALL_CORE
You can access the [facility] Patient Portal, offered by [facility], by registering with the following website: [email or web address]

## 2019-07-22 PROBLEM — O24.419 GESTATIONAL DIABETES MELLITUS IN PREGNANCY, UNSPECIFIED CONTROL: Chronic | Status: ACTIVE | Noted: 2019-06-12

## 2019-07-22 PROBLEM — N83.9 NONINFLAMMATORY DISORDER OF OVARY, FALLOPIAN TUBE AND BROAD LIGAMENT, UNSPECIFIED: Chronic | Status: ACTIVE | Noted: 2019-06-17

## 2019-10-31 PROCEDURE — 85610 PROTHROMBIN TIME: CPT

## 2019-10-31 PROCEDURE — 96374 THER/PROPH/DIAG INJ IV PUSH: CPT

## 2019-10-31 PROCEDURE — 99285 EMERGENCY DEPT VISIT HI MDM: CPT | Mod: 25

## 2019-10-31 PROCEDURE — 83690 ASSAY OF LIPASE: CPT

## 2019-10-31 PROCEDURE — 93005 ELECTROCARDIOGRAM TRACING: CPT

## 2019-10-31 PROCEDURE — 80053 COMPREHEN METABOLIC PANEL: CPT

## 2019-10-31 PROCEDURE — 74174 CTA ABD&PLVS W/CONTRAST: CPT

## 2019-10-31 PROCEDURE — 71275 CT ANGIOGRAPHY CHEST: CPT

## 2019-10-31 PROCEDURE — 81003 URINALYSIS AUTO W/O SCOPE: CPT

## 2019-10-31 PROCEDURE — 82150 ASSAY OF AMYLASE: CPT

## 2019-10-31 PROCEDURE — 36415 COLL VENOUS BLD VENIPUNCTURE: CPT

## 2019-10-31 PROCEDURE — 83605 ASSAY OF LACTIC ACID: CPT

## 2019-10-31 PROCEDURE — 96361 HYDRATE IV INFUSION ADD-ON: CPT

## 2019-10-31 PROCEDURE — 85730 THROMBOPLASTIN TIME PARTIAL: CPT

## 2019-10-31 PROCEDURE — 80048 BASIC METABOLIC PNL TOTAL CA: CPT

## 2019-10-31 PROCEDURE — 76705 ECHO EXAM OF ABDOMEN: CPT

## 2019-10-31 PROCEDURE — 71045 X-RAY EXAM CHEST 1 VIEW: CPT

## 2019-10-31 PROCEDURE — 96375 TX/PRO/DX INJ NEW DRUG ADDON: CPT

## 2019-10-31 PROCEDURE — 85027 COMPLETE CBC AUTOMATED: CPT

## 2019-10-31 PROCEDURE — 85379 FIBRIN DEGRADATION QUANT: CPT

## 2020-01-27 NOTE — OB RN PREOPERATIVE CHECKLIST - STERILIZATION AFFIRMATION
PULMONARY PROGRESS NOTE      [facility][id number]    Patient is a 72y old  Male who presents with a chief complaint of cough, sob (26 Jan 2020 17:42)      INTERVAL HPI/OVERNIGHT EVENTS: Still with sob; he has wheezing today as well. No cp. Fatigue. Only minimal improvement.     MEDICATIONS  (STANDING):  albuterol/ipratropium for Nebulization 3 milliLiter(s) Nebulizer every 6 hours  atorvastatin 20 milliGRAM(s) Oral at bedtime  dextrose 5%. 1000 milliLiter(s) (50 mL/Hr) IV Continuous <Continuous>  dextrose 50% Injectable 12.5 Gram(s) IV Push once  finasteride 5 milliGRAM(s) Oral daily  insulin lispro (HumaLOG) corrective regimen sliding scale   SubCutaneous three times a day before meals  iron sucrose IVPB 200 milliGRAM(s) IV Intermittent every 48 hours  metoprolol succinate ER 75 milliGRAM(s) Oral daily  oseltamivir 75 milliGRAM(s) Oral two times a day  pantoprazole    Tablet 40 milliGRAM(s) Oral before breakfast  saccharomyces boulardii 250 milliGRAM(s) Oral two times a day  sodium chloride 0.9%. 1000 milliLiter(s) (125 mL/Hr) IV Continuous <Continuous>  warfarin 10 milliGRAM(s) Oral once      MEDICATIONS  (PRN):  acetaminophen   Tablet .. 650 milliGRAM(s) Oral every 6 hours PRN Temp greater or equal to 38C (100.4F), Mild Pain (1 - 3)  ALPRAZolam 0.25 milliGRAM(s) Oral two times a day PRN anxiety  dextrose 40% Gel 15 Gram(s) Oral once PRN Blood Glucose LESS THAN 70 milliGRAM(s)/deciliter  glucagon  Injectable 1 milliGRAM(s) IntraMuscular once PRN Glucose LESS THAN 70 milligrams/deciliter  loperamide 2 milliGRAM(s) Oral three times a day PRN Diarrhea  oxycodone    5 mG/acetaminophen 325 mG 1 Tablet(s) Oral every 6 hours PRN Moderate Pain (4 - 6)      Allergies    Bactrim (Rash)  Ceftin (Hives)  penicillin (Anaphylaxis)  sulfa drugs (Unknown)    Intolerances        PAST MEDICAL & SURGICAL HISTORY:  HLD (hyperlipidemia)  Anxiety  Afib  DVT (deep venous thrombosis): left leg  DM (diabetes mellitus)  BPH (benign prostatic hyperplasia)  Gallbladder stone without cholecystitis or obstruction  Gastroesophageal reflux disease  Prostate disease  Hyperlipidemia  Hypertension  Anxiety  S/P laminectomy  H/O arthroscopy of knee  Malignant schwannoma      SOCIAL HISTORY  Smoking History: former smoker      REVIEW OF SYSTEMS:    CONSTITUTIONAL:  per HPI    HEENT:  Eyes:  No diplopia or blurred vision. ENT:  No earache, sore throat or runny nose.    CARDIOVASCULAR:  No pressure, squeezing, tightness, heaviness or aching about the chest; no palpitations.    RESPIRATORY:  per HPI     GASTROINTESTINAL:  No nausea, vomiting or diarrhea.    GENITOURINARY:  No dysuria, frequency or urgency.    NEUROLOGIC:  No paresthesias, fasciculations, seizures or weakness.    PSYCHIATRIC:  No disorder of thought or mood.    Vital Signs Last 24 Hrs  T(C): 37.2 (27 Jan 2020 11:47), Max: 37.2 (27 Jan 2020 11:47)  T(F): 99 (27 Jan 2020 11:47), Max: 99 (27 Jan 2020 11:47)  HR: 82 (27 Jan 2020 11:47) (80 - 89)  BP: 108/64 (27 Jan 2020 11:47) (108/64 - 146/81)  BP(mean): --  RR: 18 (27 Jan 2020 11:47) (18 - 18)  SpO2: 97% (27 Jan 2020 11:47) (93% - 99%)    PHYSICAL EXAMINATION:    GENERAL: The patient is awake and alert in no apparent distress.     HEENT: Head is normocephalic and atraumatic.  Mucous membranes are moist.    NECK: Supple.    LUNGS: b/l wheeze worse at bases, respirations unlabored    HEART: Regular rate and rhythm      ABDOMEN: Soft, nontender, and obese     EXTREMITIES: Without any cyanosis, clubbing, rash, lesions or edema.    NEUROLOGIC: Grossly intact.    LABS:                        8.7    3.86  )-----------( 219      ( 27 Jan 2020 07:00 )             33.6     01-27    139  |  100  |  15.0  ----------------------------<  133<H>  3.4<L>   |  25.0  |  0.56    Ca    8.2<L>      27 Jan 2020 07:00  Mg     1.9     01-27      PT/INR - ( 27 Jan 2020 07:00 )   PT: 18.2 sec;   INR: 1.56 ratio         PTT - ( 26 Jan 2020 07:44 )  PTT:33.1 sec       MICROBIOLOGY:  Flu A Result: Detected:          RADIOLOGY & ADDITIONAL STUDIES:  < from: CT Chest No Cont (01.24.20 @ 08:58) >     EXAM:  CT CHEST                          PROCEDURE DATE:  01/24/2020          INTERPRETATION:  CLINICAL INFORMATION: Cough, influenza, possible infiltrate    COMPARISON: 08/09/2016.    PROCEDURE:   CT of the Chest was performed without intravenouscontrast.  Sagittal and coronal reformats were performed.  MIP reformats were performed.    FINDINGS:    LUNGS AND AIRWAYS: Patent central airways.  6 mm nodule in the right middle lobe.    PLEURA: No pleural effusion.    MEDIASTINUM AND MOLLY: Few calcified mediastinal lymph nodes.    VESSELS: Within normal limits.    HEART: Heart size is normal. No pericardial effusion. Coronary artery calcifications.    CHEST WALL AND LOWER NECK: Within normal limits.    VISUALIZED UPPER ABDOMEN: Gallbladder ismildly distended.    BONES: Multilevel degenerative changes of the spine.    IMPRESSION:     No focal consolidation or pleural effusion.    There is a 6 mm nodule in the right middle lobe. 6-12 month follow-up chest CT is recommended.                    DONNIE WATSON M.D., ATTENDING RADIOLOGIST    < end of copied text > n/a

## 2021-09-24 NOTE — ED ADULT NURSE NOTE - BREATHING, MLM
c/o of chronic constipation worsening x 1 month with no good BM x 2 weeks. Last one has 6 days ago with minimal stool. c/o of abomdinal pressure and occasional nausea Spontaneous, unlabored and symmetrical

## 2021-11-05 NOTE — DISCHARGE NOTE OB - IF LEAKING COLOSTRUM, UTILIZE BREAST PADS.  DO NOT USE VAGINAL MINI PADS FOR THIS PURPOSE
Walking pulse ox done, pt requires 5L NC to keep her oxygen saturation above 88%.   Statement Selected

## 2022-08-10 NOTE — ED ADULT NURSE NOTE - NSFALLRSKASSESSTYPE_ED_ALL_ED
Mirvaso Pregnancy And Lactation Text: This medication has not been assigned a Pregnancy Risk Category. It is unknown if the medication is excreted in breast milk. Initial (On Arrival)

## 2023-02-27 NOTE — OB RN TRIAGE NOTE - NSLDARRIVAL_OBGYN_ALL_OB_START_DATE
06-Jun-2019 17:47 Pt arrives as referral from [facility] for appendicitis workup. She has no white count from blood work there. She has RLQ pain that started suddenly this morning.

## 2023-05-24 NOTE — OB PROVIDER TRIAGE NOTE - HISTORY OF PRESENT ILLNESS
4 (moderate pain) Dr. Gonzalez's pt. is a 29y/o Dr. Gonzalez's pt. is a 29y/o @36.2wks . Pt. was sent in from the ATU for nonreactive tracing. Pt. denies abdominal contractions , LOF and VB. Pt. reports FM.

## 2023-06-20 NOTE — OB RN DELIVERY SUMMARY - BABY A: APGAR 5 MIN SCORE, DELIVERY
Detail Level: Simple Instructions: This plan will send the code FBSE to the PM system.  DO NOT or CHANGE the price. Price (Do Not Change): 0.00 9

## 2023-09-30 NOTE — PROGRESS NOTE ADULT - ASSESSMENT
28 years old F with PMH of obesity and a recent C/S delivery about a month ago presented with severe epigastric pain. oral/endotracheal tube

## 2024-07-15 NOTE — PATIENT PROFILE OB - VISION (WITH CORRECTIVE LENSES IF THE PATIENT USUALLY WEARS THEM):
For pain, you may take Tylenol 650mg every 4-6 hours as needed. Please keep track of how much Tylenol (acetaminophen) you are taking. Do not take more than 4,000 mg per day.    General Discharge Instructions:  Please resume all regular home medications unless specifically advised not to take a particular medication. Also, please take any new medications as prescribed.  Please get plenty of rest, continue to ambulate several times per day, and drink adequate amounts of fluids. Avoid lifting weights greater than 5-10 lbs until you follow-up with your surgeon, who will instruct you further regarding activity restrictions.  Avoid driving or operating heavy machinery while taking pain medications.  Please follow-up with your surgeon and Primary Care Provider (PCP) as advised.  Incision Care:  *Please call your doctor or nurse practitioner if you have increased pain, swelling, redness, or drainage from the incision site.  *Avoid swimming and baths until your follow-up appointment.  *You may shower, and wash surgical incisions with a mild soap and warm water. Gently pat the area dry.  *If you have staples, they will be removed at your follow-up appointment.  *If you have steri-strips, they will fall off on their own. Please remove any remaining strips 7-10 days after surgery. Normal vision: sees adequately in most situations; can see medication labels, newsprint

## 2024-10-23 NOTE — H&P ADULT - PROBLEM SELECTOR PLAN 3
Message left to patient to have her disability form dropped off and that standard postpartum leave is 6 weeks after vaginal delivery, 8 weeks for .    IMPROVE VTE Individual Risk Assessment          RISK                                                          Points    [  ] Previous VTE                                                3  [  ] Thrombophilia                                             2  [  ] Lower limb paralysis                                    2        (unable to hold up >15 seconds)    [  ] Current Cancer                                             2         (within 6 months)  [ x ] Immobilization > 24 hrs                              1  [  ] ICU/CCU stay > 24 hours                            1  [  ] Age > 60                                                    1    IMPROVE VTE Score 1.    ** Patient is at low risk for VTE, IMPROVE score of one, will start B/L intermittent pneumatic compression device while in bed for mechanical DVT prophylaxis, in addition to ambulation.

## 2025-06-17 NOTE — OB PST NOTE - ASSESSMENT
Patient is here for port labs. Tolerated well. Patient will return for treatment tomorrow. Discharged in stable condition.   
28 y.o. female with preop diagnosis of encounter for  delivery without indication